# Patient Record
Sex: MALE | Race: WHITE | HISPANIC OR LATINO | Employment: OTHER | URBAN - METROPOLITAN AREA
[De-identification: names, ages, dates, MRNs, and addresses within clinical notes are randomized per-mention and may not be internally consistent; named-entity substitution may affect disease eponyms.]

---

## 2019-12-18 ENCOUNTER — TRANSFERRED RECORDS (OUTPATIENT)
Dept: HEALTH INFORMATION MANAGEMENT | Facility: CLINIC | Age: 59
End: 2019-12-18

## 2023-02-23 ENCOUNTER — MEDICAL CORRESPONDENCE (OUTPATIENT)
Dept: HEALTH INFORMATION MANAGEMENT | Facility: CLINIC | Age: 63
End: 2023-02-23

## 2023-03-02 ENCOUNTER — TRANSFERRED RECORDS (OUTPATIENT)
Dept: HEALTH INFORMATION MANAGEMENT | Facility: CLINIC | Age: 63
End: 2023-03-02

## 2023-03-06 ENCOUNTER — TRANSFERRED RECORDS (OUTPATIENT)
Dept: HEALTH INFORMATION MANAGEMENT | Facility: CLINIC | Age: 63
End: 2023-03-06

## 2023-03-07 ENCOUNTER — REFERRAL (OUTPATIENT)
Dept: TRANSPLANT | Facility: CLINIC | Age: 63
End: 2023-03-07

## 2023-03-07 DIAGNOSIS — E11.9 DIABETES MELLITUS, TYPE 2 (H): ICD-10-CM

## 2023-03-07 DIAGNOSIS — Z01.818 ENCOUNTER FOR PRE-TRANSPLANT EVALUATION FOR KIDNEY AND PANCREAS TRANSPLANT: ICD-10-CM

## 2023-03-07 DIAGNOSIS — Z99.2 ESRD (END STAGE RENAL DISEASE) ON DIALYSIS (H): ICD-10-CM

## 2023-03-07 DIAGNOSIS — I10 ESSENTIAL HYPERTENSION: ICD-10-CM

## 2023-03-07 DIAGNOSIS — N18.6 ESRD (END STAGE RENAL DISEASE) ON DIALYSIS (H): ICD-10-CM

## 2023-03-07 DIAGNOSIS — N18.5 CHRONIC KIDNEY DISEASE, STAGE 5, KIDNEY FAILURE (H): Primary | ICD-10-CM

## 2023-03-07 DIAGNOSIS — H90.3 SNHL (SENSORY-NEURAL HEARING LOSS), ASYMMETRICAL: ICD-10-CM

## 2023-03-07 DIAGNOSIS — Z76.82 ORGAN TRANSPLANT CANDIDATE: ICD-10-CM

## 2023-03-07 DIAGNOSIS — E11.40 DIABETIC NEUROPATHY (H): ICD-10-CM

## 2023-03-07 DIAGNOSIS — N18.6 END STAGE RENAL DISEASE (H): ICD-10-CM

## 2023-03-07 NOTE — LETTER
3/9/2023  Olayinka Arciniega  C/o Nan Sosa/patrick  [address]  [facility] [address]      Dear Olayinka,    Thank you for your interest in the Transplant Center at [facility]. We look forward to being a part of your care team and assisting you through the transplant process.    As we discussed, your transplant coordinator is Marta Meza (Pancreas, Kidney).  You may call your coordinator at any time with questions or concerns.  Your first scheduled call will be on 3/21/2023 between 8am-12pm.  If this needs to change, call [phone].    Please complete the following.     1. Fill out and return the enclosed forms    Authorization for Electronic Communication    Authorization to Discuss Protected Health Information    Authorization for Release of Protected Health Information    2. Sign up for:    [employer], access to your electronic medical record (see enclosed pamphlet)    [employer]EARCH, a transplant education website    You can use these tools to learn more about your transplant, communicate with your care team, and track your medical details      Sincerely,      Solid Organ Transplant  [facility]    cc: Referring Physician

## 2023-03-09 ENCOUNTER — DOCUMENTATION ONLY (OUTPATIENT)
Dept: TRANSPLANT | Facility: CLINIC | Age: 63
End: 2023-03-09

## 2023-03-09 VITALS — BODY MASS INDEX: 37.8 KG/M2 | WEIGHT: 249.4 LBS | HEIGHT: 68 IN

## 2023-03-09 NOTE — TELEPHONE ENCOUNTER
PCP: Billie Vilchis DNP  Referring Provider: Billie Vilchis DNP  Referring Diagnosis: CKD Stage 5, DM Type 2 (insulin dependent)    GFR/Date: 9 (3/1/2023)    Is patient under the age of 65? Yes  Is patient diabetic? Yes  Is patient on insulin? Yes  Was patient offered a pancreas transplant referral? Yes    Is patient in a group home/assisted living? Correctional/Treatment facility  Does patient have a guardian? No    Referral intake process completed.  Patient is aware that after financial approval is received, medical records will be requested.   Patient confirmed for a callback from transplant coordinator on 3/21/2023. (within 2 weeks)  Tentative evaluation date will be made after patient's dialysis days are figured out.  Nan with [facility] will call back.  (within 4 weeks) if appointment is virtual, does patient have capabilities of setting this up? Yes, patient is in a Correctional/Treatment facility that would be  Able to set up video phone call.  Nan with [facility] phone# [phone].    Confirmed coordinator will discuss evaluation process in more detail at the time of their call.   Patient is aware of the need to arrange age appropriate cancer screening, vaccinations, and dental care.  Reminded patient to complete questionnaire, complete medical records release, and review packet prior to evaluation visit .    Assessed patient for special needs (ie-wheelchair, assistance, guardian, and ):  Yes  Patient uses a 4 wheel walker, wheelchair and cane when needed.     Patient instructed to call [phone] with questions.     Patient gave verbal consent during intake call to obtain medical records and documents outside of [facility]:  Yes

## 2023-03-21 ENCOUNTER — DOCUMENTATION ONLY (OUTPATIENT)
Dept: TRANSPLANT | Facility: CLINIC | Age: 63
End: 2023-03-21
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
## 2023-03-22 PROBLEM — H90.3 SNHL (SENSORY-NEURAL HEARING LOSS), ASYMMETRICAL: Status: ACTIVE | Noted: 2023-03-22

## 2023-03-22 PROBLEM — Z99.2 ESRD (END STAGE RENAL DISEASE) ON DIALYSIS (H): Status: ACTIVE | Noted: 2023-03-22

## 2023-03-22 PROBLEM — E11.40 DIABETIC NEUROPATHY (H): Status: ACTIVE | Noted: 2023-03-22

## 2023-03-22 PROBLEM — E11.9 DIABETES MELLITUS, TYPE 2 (H): Status: ACTIVE | Noted: 2023-03-22

## 2023-03-22 PROBLEM — N18.6 ESRD (END STAGE RENAL DISEASE) ON DIALYSIS (H): Status: ACTIVE | Noted: 2023-03-22

## 2023-03-23 ENCOUNTER — TELEPHONE (OUTPATIENT)
Dept: TRANSPLANT | Facility: CLINIC | Age: 63
End: 2023-03-23
Payer: MEDICAID

## 2023-03-23 NOTE — TELEPHONE ENCOUNTER
Coordinated with Nan at [facility] [place]8/23 as patient's evaluation for kid/panc transplant. Told Nan that patient needs to  Be at clinic at 7:30 am on 5/8. 2 guards will be with him.  
none

## 2023-04-17 ENCOUNTER — TRANSFERRED RECORDS (OUTPATIENT)
Dept: HEALTH INFORMATION MANAGEMENT | Facility: CLINIC | Age: 63
End: 2023-04-17

## 2023-05-04 ENCOUNTER — TELEPHONE (OUTPATIENT)
Dept: TRANSPLANT | Facility: CLINIC | Age: 63
End: 2023-05-04
Payer: MEDICAID

## 2023-05-04 ENCOUNTER — DOCUMENTATION ONLY (OUTPATIENT)
Dept: TRANSPLANT | Facility: CLINIC | Age: 63
End: 2023-05-04
Payer: MEDICAID

## 2023-05-07 LAB
A1 AB TITR SERPL: 16 {TITER}
A1 AB TITR SERPL: 32 {TITER}
A2 AB TITR SERPL: 2 {TITER}
A2 AB TITR SERPL: 2 {TITER}
ABO/RH(D): NORMAL
ABO/RH(D): NORMAL
ANTIBODY SCREEN: NEGATIVE
ANTIBODY TITER IGM SCREEN: NEGATIVE
SPECIMEN EXPIRATION DATE: NORMAL

## 2023-05-08 ENCOUNTER — DOCUMENTATION ONLY (OUTPATIENT)
Dept: TRANSPLANT | Facility: CLINIC | Age: 63
End: 2023-05-08

## 2023-05-08 ENCOUNTER — ALLIED HEALTH/NURSE VISIT (OUTPATIENT)
Dept: TRANSPLANT | Facility: CLINIC | Age: 63
End: 2023-05-08
Attending: NURSE PRACTITIONER
Payer: MEDICAID

## 2023-05-08 ENCOUNTER — ANCILLARY PROCEDURE (OUTPATIENT)
Dept: CARDIOLOGY | Facility: CLINIC | Age: 63
End: 2023-05-08
Attending: NURSE PRACTITIONER
Payer: MEDICAID

## 2023-05-08 ENCOUNTER — APPOINTMENT (OUTPATIENT)
Dept: TRANSPLANT | Facility: CLINIC | Age: 63
End: 2023-05-08
Attending: INTERNAL MEDICINE
Payer: MEDICAID

## 2023-05-08 ENCOUNTER — LAB (OUTPATIENT)
Dept: LAB | Facility: CLINIC | Age: 63
End: 2023-05-08
Payer: MEDICAID

## 2023-05-08 ENCOUNTER — ANCILLARY PROCEDURE (OUTPATIENT)
Dept: GENERAL RADIOLOGY | Facility: CLINIC | Age: 63
End: 2023-05-08
Attending: NURSE PRACTITIONER
Payer: MEDICAID

## 2023-05-08 VITALS
SYSTOLIC BLOOD PRESSURE: 131 MMHG | DIASTOLIC BLOOD PRESSURE: 81 MMHG | WEIGHT: 247 LBS | OXYGEN SATURATION: 96 % | BODY MASS INDEX: 36.58 KG/M2 | HEART RATE: 76 BPM | HEIGHT: 69 IN

## 2023-05-08 DIAGNOSIS — Z01.818 ENCOUNTER FOR PRE-TRANSPLANT EVALUATION FOR KIDNEY AND PANCREAS TRANSPLANT: ICD-10-CM

## 2023-05-08 DIAGNOSIS — Z76.82 ORGAN TRANSPLANT CANDIDATE: ICD-10-CM

## 2023-05-08 DIAGNOSIS — N18.5 CHRONIC KIDNEY DISEASE, STAGE 5, KIDNEY FAILURE (H): ICD-10-CM

## 2023-05-08 DIAGNOSIS — E11.9 DIABETES MELLITUS, TYPE 2 (H): ICD-10-CM

## 2023-05-08 DIAGNOSIS — Z79.4 TYPE 2 DIABETES MELLITUS WITH HYPEROSMOLAR COMA, WITH LONG-TERM CURRENT USE OF INSULIN (H): ICD-10-CM

## 2023-05-08 DIAGNOSIS — N18.6 END STAGE RENAL DISEASE (H): ICD-10-CM

## 2023-05-08 DIAGNOSIS — N18.6 TYPE 2 DIABETES MELLITUS WITH CHRONIC KIDNEY DISEASE ON CHRONIC DIALYSIS, WITH LONG-TERM CURRENT USE OF INSULIN (H): ICD-10-CM

## 2023-05-08 DIAGNOSIS — I10 ESSENTIAL HYPERTENSION: ICD-10-CM

## 2023-05-08 DIAGNOSIS — Z01.818 ENCOUNTER FOR PRE-TRANSPLANT EVALUATION FOR KIDNEY AND PANCREAS TRANSPLANT: Primary | ICD-10-CM

## 2023-05-08 DIAGNOSIS — N25.81 SECONDARY HYPERPARATHYROIDISM (H): ICD-10-CM

## 2023-05-08 DIAGNOSIS — I15.0 RENOVASCULAR HYPERTENSION: ICD-10-CM

## 2023-05-08 DIAGNOSIS — E11.22 TYPE 2 DIABETES MELLITUS WITH CHRONIC KIDNEY DISEASE ON CHRONIC DIALYSIS, WITH LONG-TERM CURRENT USE OF INSULIN (H): ICD-10-CM

## 2023-05-08 DIAGNOSIS — Z79.4 TYPE 2 DIABETES MELLITUS WITH CHRONIC KIDNEY DISEASE ON CHRONIC DIALYSIS, WITH LONG-TERM CURRENT USE OF INSULIN (H): ICD-10-CM

## 2023-05-08 DIAGNOSIS — E66.01 MORBID OBESITY (H): Primary | ICD-10-CM

## 2023-05-08 DIAGNOSIS — Z99.2 TYPE 2 DIABETES MELLITUS WITH CHRONIC KIDNEY DISEASE ON CHRONIC DIALYSIS, WITH LONG-TERM CURRENT USE OF INSULIN (H): ICD-10-CM

## 2023-05-08 DIAGNOSIS — E11.01 TYPE 2 DIABETES MELLITUS WITH HYPEROSMOLAR COMA, WITH LONG-TERM CURRENT USE OF INSULIN (H): ICD-10-CM

## 2023-05-08 LAB
ALBUMIN SERPL BCG-MCNC: 4 G/DL (ref 3.5–5.2)
ALBUMIN UR-MCNC: 300 MG/DL
ALP SERPL-CCNC: 136 U/L (ref 40–129)
ALT SERPL W P-5'-P-CCNC: 27 U/L (ref 10–50)
ANION GAP SERPL CALCULATED.3IONS-SCNC: 12 MMOL/L (ref 7–15)
APPEARANCE UR: CLEAR
APTT PPP: 28 SECONDS (ref 22–38)
AST SERPL W P-5'-P-CCNC: 26 U/L (ref 10–50)
BASOPHILS # BLD AUTO: 0 10E3/UL (ref 0–0.2)
BASOPHILS NFR BLD AUTO: 0 %
BILIRUB SERPL-MCNC: 0.6 MG/DL
BILIRUB UR QL STRIP: NEGATIVE
BUN SERPL-MCNC: 44.8 MG/DL (ref 8–23)
CALCIUM SERPL-MCNC: 9.1 MG/DL (ref 8.8–10.2)
CHLORIDE SERPL-SCNC: 103 MMOL/L (ref 98–107)
COLOR UR AUTO: ABNORMAL
CREAT SERPL-MCNC: 5.93 MG/DL (ref 0.67–1.17)
DEPRECATED HCO3 PLAS-SCNC: 25 MMOL/L (ref 22–29)
EOSINOPHIL # BLD AUTO: 0.1 10E3/UL (ref 0–0.7)
EOSINOPHIL NFR BLD AUTO: 1 %
ERYTHROCYTE [DISTWIDTH] IN BLOOD BY AUTOMATED COUNT: 13.8 % (ref 10–15)
FACTOR 2 INTERPRETATION: NORMAL
FACTOR V INTERPRETATION: NORMAL
GFR SERPL CREATININE-BSD FRML MDRD: 10 ML/MIN/1.73M2
GLUCOSE SERPL-MCNC: 188 MG/DL (ref 70–99)
GLUCOSE UR STRIP-MCNC: 100 MG/DL
HBA1C MFR BLD: 7.6 %
HBV CORE AB SERPL QL IA: NONREACTIVE
HBV SURFACE AB SERPL IA-ACNC: >1000 M[IU]/ML
HBV SURFACE AB SERPL IA-ACNC: REACTIVE M[IU]/ML
HBV SURFACE AG SERPL QL IA: NONREACTIVE
HCT VFR BLD AUTO: 35.1 % (ref 40–53)
HCV AB SERPL QL IA: NONREACTIVE
HGB BLD-MCNC: 11.5 G/DL (ref 13.3–17.7)
HGB UR QL STRIP: ABNORMAL
HIV 1+2 AB+HIV1 P24 AG SERPL QL IA: NONREACTIVE
HYALINE CASTS: 6 /LPF
IMM GRANULOCYTES # BLD: 0 10E3/UL
IMM GRANULOCYTES NFR BLD: 0 %
INR PPP: 1.09 (ref 0.85–1.15)
KETONES UR STRIP-MCNC: NEGATIVE MG/DL
LAB DIRECTOR COMMENTS: NORMAL
LAB DIRECTOR DISCLAIMER: NORMAL
LAB DIRECTOR INTERPRETATION: NORMAL
LAB DIRECTOR METHODOLOGY: NORMAL
LAB DIRECTOR RESULTS: NORMAL
LEUKOCYTE ESTERASE UR QL STRIP: NEGATIVE
LVEF ECHO: NORMAL
LYMPHOCYTES # BLD AUTO: 2.4 10E3/UL (ref 0.8–5.3)
LYMPHOCYTES NFR BLD AUTO: 24 %
MCH RBC QN AUTO: 28.5 PG (ref 26.5–33)
MCHC RBC AUTO-ENTMCNC: 32.8 G/DL (ref 31.5–36.5)
MCV RBC AUTO: 87 FL (ref 78–100)
MONOCYTES # BLD AUTO: 0.6 10E3/UL (ref 0–1.3)
MONOCYTES NFR BLD AUTO: 6 %
MUCOUS THREADS #/AREA URNS LPF: PRESENT /LPF
NEUTROPHILS # BLD AUTO: 6.8 10E3/UL (ref 1.6–8.3)
NEUTROPHILS NFR BLD AUTO: 69 %
NITRATE UR QL: NEGATIVE
NRBC # BLD AUTO: 0 10E3/UL
NRBC BLD AUTO-RTO: 0 /100
PH UR STRIP: 6 [PH] (ref 5–7)
PLATELET # BLD AUTO: 212 10E3/UL (ref 150–450)
POTASSIUM SERPL-SCNC: 4.8 MMOL/L (ref 3.4–5.3)
PROT SERPL-MCNC: 8.1 G/DL (ref 6.4–8.3)
PSA SERPL DL<=0.01 NG/ML-MCNC: 1.08 NG/ML (ref 0–4.5)
RBC # BLD AUTO: 4.04 10E6/UL (ref 4.4–5.9)
RBC URINE: 1 /HPF
SODIUM SERPL-SCNC: 140 MMOL/L (ref 136–145)
SP GR UR STRIP: 1.02 (ref 1–1.03)
SPECIMEN DESCRIPTION: NORMAL
SPERM #/AREA URNS HPF: PRESENT /HPF
SQUAMOUS EPITHELIAL: <1 /HPF
T PALLIDUM AB SER QL: NONREACTIVE
UROBILINOGEN UR STRIP-MCNC: NORMAL MG/DL
WBC # BLD AUTO: 10 10E3/UL (ref 4–11)
WBC URINE: 2 /HPF

## 2023-05-08 PROCEDURE — 86833 HLA CLASS II HIGH DEFIN QUAL: CPT

## 2023-05-08 PROCEDURE — 85670 THROMBIN TIME PLASMA: CPT

## 2023-05-08 PROCEDURE — 93306 TTE W/DOPPLER COMPLETE: CPT | Mod: GC | Performed by: INTERNAL MEDICINE

## 2023-05-08 PROCEDURE — 85730 THROMBOPLASTIN TIME PARTIAL: CPT

## 2023-05-08 PROCEDURE — 71046 X-RAY EXAM CHEST 2 VIEWS: CPT | Performed by: RADIOLOGY

## 2023-05-08 PROCEDURE — G0463 HOSPITAL OUTPT CLINIC VISIT: HCPCS

## 2023-05-08 PROCEDURE — G0452 MOLECULAR PATHOLOGY INTERPR: HCPCS | Mod: 26 | Performed by: STUDENT IN AN ORGANIZED HEALTH CARE EDUCATION/TRAINING PROGRAM

## 2023-05-08 PROCEDURE — 85730 THROMBOPLASTIN TIME PARTIAL: CPT | Mod: 91

## 2023-05-08 PROCEDURE — 81240 F2 GENE: CPT

## 2023-05-08 PROCEDURE — 99207 PR STATISTIC IV PUSH SINGLE INITIAL SUBSTANCE: CPT | Performed by: INTERNAL MEDICINE

## 2023-05-08 PROCEDURE — 86850 RBC ANTIBODY SCREEN: CPT

## 2023-05-08 PROCEDURE — 85610 PROTHROMBIN TIME: CPT

## 2023-05-08 PROCEDURE — 86901 BLOOD TYPING SEROLOGIC RH(D): CPT

## 2023-05-08 PROCEDURE — 83036 HEMOGLOBIN GLYCOSYLATED A1C: CPT

## 2023-05-08 PROCEDURE — 86706 HEP B SURFACE ANTIBODY: CPT

## 2023-05-08 PROCEDURE — 86787 VARICELLA-ZOSTER ANTIBODY: CPT

## 2023-05-08 PROCEDURE — 81001 URINALYSIS AUTO W/SCOPE: CPT

## 2023-05-08 PROCEDURE — 99205 OFFICE O/P NEW HI 60 MIN: CPT | Performed by: SURGERY

## 2023-05-08 PROCEDURE — 86886 COOMBS TEST INDIRECT TITER: CPT

## 2023-05-08 PROCEDURE — 93000 ELECTROCARDIOGRAM COMPLETE: CPT | Performed by: INTERNAL MEDICINE

## 2023-05-08 PROCEDURE — 85390 FIBRINOLYSINS SCREEN I&R: CPT | Mod: 26 | Performed by: PATHOLOGY

## 2023-05-08 PROCEDURE — 86803 HEPATITIS C AB TEST: CPT

## 2023-05-08 PROCEDURE — 86665 EPSTEIN-BARR CAPSID VCA: CPT

## 2023-05-08 PROCEDURE — 86147 CARDIOLIPIN ANTIBODY EA IG: CPT

## 2023-05-08 PROCEDURE — 81382 HLA II TYPING 1 LOC HR: CPT | Mod: XU

## 2023-05-08 PROCEDURE — 36415 COLL VENOUS BLD VENIPUNCTURE: CPT

## 2023-05-08 PROCEDURE — 87340 HEPATITIS B SURFACE AG IA: CPT

## 2023-05-08 PROCEDURE — 86704 HEP B CORE ANTIBODY TOTAL: CPT

## 2023-05-08 PROCEDURE — 86644 CMV ANTIBODY: CPT

## 2023-05-08 PROCEDURE — 85018 HEMOGLOBIN: CPT

## 2023-05-08 PROCEDURE — G0103 PSA SCREENING: HCPCS

## 2023-05-08 PROCEDURE — 86780 TREPONEMA PALLIDUM: CPT

## 2023-05-08 PROCEDURE — 99215 OFFICE O/P EST HI 40 MIN: CPT

## 2023-05-08 PROCEDURE — 86481 TB AG RESPONSE T-CELL SUSP: CPT

## 2023-05-08 PROCEDURE — 80053 COMPREHEN METABOLIC PANEL: CPT

## 2023-05-08 PROCEDURE — 84681 ASSAY OF C-PEPTIDE: CPT

## 2023-05-08 PROCEDURE — 86832 HLA CLASS I HIGH DEFIN QUAL: CPT

## 2023-05-08 RX ORDER — FAMOTIDINE 20 MG/1
20 TABLET, FILM COATED ORAL
COMMUNITY

## 2023-05-08 RX ORDER — CALCITRIOL 0.5 UG/1
1 CAPSULE, LIQUID FILLED ORAL DAILY
COMMUNITY
Start: 2023-02-22

## 2023-05-08 RX ORDER — LIRAGLUTIDE 6 MG/ML
1.8 INJECTION SUBCUTANEOUS
COMMUNITY

## 2023-05-08 RX ORDER — ATORVASTATIN CALCIUM 40 MG/1
40 TABLET, FILM COATED ORAL AT BEDTIME
COMMUNITY

## 2023-05-08 RX ORDER — POLYVINYL ALCOHOL 14 MG/ML
SOLUTION/ DROPS OPHTHALMIC
COMMUNITY
Start: 2022-06-24

## 2023-05-08 RX ORDER — TAMSULOSIN HYDROCHLORIDE 0.4 MG/1
0.4 CAPSULE ORAL
COMMUNITY
Start: 2021-09-13

## 2023-05-08 RX ORDER — FUROSEMIDE 40 MG
80 TABLET ORAL
COMMUNITY
Start: 2023-02-22

## 2023-05-08 RX ORDER — TRIAMCINOLONE ACETONIDE 1 MG/G
OINTMENT TOPICAL
COMMUNITY

## 2023-05-08 RX ORDER — LISINOPRIL 10 MG/1
1 TABLET ORAL DAILY
COMMUNITY
Start: 2023-02-22

## 2023-05-08 RX ADMIN — Medication 5 ML: at 14:06

## 2023-05-08 NOTE — LETTER
5/8/2023         RE: Olayinka Arciniega  [facility]  [address]  [facility] [address]        Dear Colleague,    Thank you for referring your patient, Olayinka Arciniega, to the [facility] TRANSPL[facility]. Please see a copy of my visit note below.    TRANSPLANT NEPHROLOGY RECIPIENT EVALUATION NOTE    Recommendations:   - weight loss  - cardiology  - CT a/p and iliac US.   - social work input    Assessment and Plan:  # Kidney/Pancreas Transplant Evaluation: Patient is a fair candidate overall. Benefits of a living donor transplant were discussed.    # ESKD from presumed diabetes mellitus type 2: although doing OK on hemodialysis since 3/2023, he may benefit from a kidney transplant, ABO-B, no donors.     #Type 2 diabetes: A1c 6.8% using NPH 75 units daily plus Victoza.     # Cardiac Risk: needs risk assessment with [facility] given 20 + years of diabetes, family history of premature CAD and other risk factors.     # PAD Screening: needs CT a/p and iliac US.     # BMI 36.5.     # Resides in correctional facility: where staff administers all medications where he reports good compliance. Appreciate social work input.     # H/o polysubstance use (cocaine, marijuana): went to CD treatment at some point, quit in 1992. Appreciate social work input.     # Health Maintenance: 2/2023 Colonoscopy (needs records from [facility]): reportedly UTD and Dental: Not Up to date (dental work pending).     Discussed the risks and benefits of a transplant, including the risk of surgery and immunosuppression medications.  Patient's overall evaluation will be discussed in the Transplant Program's regular meeting with a final recommendation on the patients suitability for transplant to be made at that time.    Pending completion of the full evaluation, patient presently appears to be enough of an acceptable kidney transplant recipient candidate to have any potential kidney donors start the evaluation  process.      Evaluation:  Olayinka Arciniega was seen in consultation at the request of Dr. Kel Sierra for evaluation as a potential kidney/pancreas transplant recipient.    Reason for Visit:  Olayinka Arciniega is a 63 year old male with ESKD from diabetes mellitus type 2, who presents for kidney/pancreas transplant evaluation.    History of Present Illness:            Kidney Disease Hx:   Olayinka Arciniega is a 63-year-old male with history of ESKD secondary to presumed type 2 diabetes. 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 Historical  creatinines include 1.05 in 2015 -> 1.9 in 2019 -> low 2s in 2020 -> 4.64 in 2/2023. Ultimately started HD on  3/13/2023.               Kidney Disease Dx: Diabetes mellitus type 2       Biopsy Proven: No         On Dialysis: Yes, Date initiated: 3/2023  and Dialysis Type: [facility]; usign chest catheter, making urine every day, BPs controlled.        Primary Nephrologist: Dr. Telles in [place], [place]       H/o Kidney Stones: No       H/o Recurrent/Frequent UTI: No         Diabetic Hx: Type 2        Diagnosis Date: 1994 (age 37)        Medication History: started on metformin at the beginning, using insulin since 2017, currently NPH 75 units daily plus Victoza.        Diabetic Control: Controlled (HbA1c <7%)   Last HbA1c: 6.8%       Hypoglycemic Unawareness: Yes, down to 50 once every 6 months,  Checks blood sugar twice daily and typically  < 150.         End-Organ Damage due to DM: Retinopathy, Nephropathy and Peripheral neuropathy          Cardiac/Vascular Disease Risk Factors:        Cardiac Risk Factors: Diabetes, Hypertension, Smoking, Age (Male > 55, Female > 65) and Family History of Premature CAD       Known CAD: No       Known PAD/Caludication Symptoms: No       Known Heart Failure: No       Arrhythmia: No       Pulmonary Hypertension: No       Valvular Disease: No       Other: None         Viral Serology Status       CMV IgG Antibody: Unknown        EBV IgG Antibody: Unknown         Volume Status/Weight:        Volume status: Mildly hypervolemic       Weight:  Acceptable BMI       BMI: Body mass index is 36.58 kg/m .         Functional Capacity/Frailty:       Walks every day around his facility and courtyard with some dyspnea but no chest pains.     Fatigue/Decreased Energy: [x] No [] Yes    Chest Pain or SOB with Exertion: [] No [x] Yes    Significant Weight Change: [x] No [] Yes    Nausea, Vomiting or Diarrhea: [] No [x] Yes Occasional nausea, chronic loose stools   Fever, Sweats or Chills:  [x] No [] Yes    Leg Swelling [x] No [] Yes        History of Cancer: None    Other Significant Medical Issues:   - Former minimal tobacco history: quit in early 20s.    - Polysubstance (cocaine, marijuana): no H/o CD treatment, quit in 92        Allergy Testing Questions:   Medication that caused a reaction None   Antibiotics used that didn't give an allergic reaction?  Patient doesn't know    COVID Vaccination Up To Date: Yes    Potential Living Kidney Donors: No    Review of Systems:  A comprehensive review of systems was obtained and negative, except as noted in the HPI or PMH.    Past Medical History:   Medical record was reviewed and PMH was discussed with patient and noted below.  Past Medical History:   Diagnosis Date    Diabetes mellitus, type 2 (H)     Diabetic neuropathy (H)     ESRD (end stage renal disease) on dialysis (H)     Hypertension     SNHL (sensory-neural hearing loss), asymmetrical        Past Social History:   Past Surgical History:   Procedure Laterality Date    COLONOSCOPY  2019    DIALYSIS FISTULA CREATION  09/2022     Personal history of bleeding or anesthesia problems: No    Family History:  No family history on file.    Personal History:   Social History     Socioeconomic History    Marital status:      Spouse name: Not on file    Number of children: Not on file    Years of education: Not on file    Highest education level: Not on  "file   Occupational History    Not on file   Tobacco Use    Smoking status: Former     Types: Cigarettes     Quit date:      Years since quittin.3    Smokeless tobacco: Never    Tobacco comments:     4.5 packs per day for 12 years   Vaping Use    Vaping status: Not on file   Substance and Sexual Activity    Alcohol use: Not Currently    Drug use: Not Currently    Sexual activity: Not on file   Other Topics Concern    Not on file   Social History Narrative    Not on file     Social Determinants of Health     Financial Resource Strain: Not on file   Food Insecurity: Not on file   Transportation Needs: Not on file   Physical Activity: Not on file   Stress: Not on file   Social Connections: Not on file   Intimate Partner Violence: Not on file   Housing Stability: Not on file       Allergies:  Allergies   Allergen Reactions    Ibuprofen        Medications:  Current Outpatient Medications   Medication Sig    atorvastatin (LIPITOR) 40 MG tablet Take 40 mg by mouth At Bedtime    calcitRIOL (ROCALTROL) 0.5 MCG capsule Take 1 capsule by mouth daily    cholecalciferol (VITAMIN D3) 25 mcg (1000 units) capsule Take 2,000 Units by mouth    famotidine (PEPCID) 20 MG tablet Take 20 mg by mouth    furosemide (LASIX) 40 MG tablet Take 80 mg by mouth    insulin  UNIT/ML vial Inject 80 Units Subcutaneous    liraglutide (VICTOZA) 18 MG/3ML solution Inject 1.8 mg Subcutaneous    lisinopril (ZESTRIL) 10 MG tablet Take 1 tablet by mouth daily    polyvinyl alcohol (LIQUIFILM TEARS) 1.4 % ophthalmic solution APPLY 1 DROP INTO EYE(S) AS DIRECTED UP TO 6-8 TIMES A DAY    tamsulosin (FLOMAX) 0.4 MG capsule Take 0.4 mg by mouth    triamcinolone (KENALOG) 0.1 % external ointment     Urea 20 % OINT Apply 1 Dose topically     No current facility-administered medications for this visit.       Vitals:  /81   Pulse 76   Ht 1.75 m (5' 8.9\")   Wt 112 kg (247 lb)   SpO2 96%   BMI 36.58 kg/m      Exam:  GENERAL APPEARANCE: alert " and no distress  HENT: mouth without ulcers or lesions  LYMPHATICS: no cervical or supraclavicular nodes  RESP: lungs clear to auscultation - no rales, rhonchi or wheezes  CV: regular rhythm, normal rate, no rub, no murmur  FEMORAL PULSES: normal  EDEMA: no LE edema bilaterally  ABDOMEN: soft, nondistended, nontender, bowel sounds normal  MS: extremities normal - no gross deformities noted, no evidence of inflammation in joints, no muscle tenderness  SKIN: no rash    Results:   No results found for this or any previous visit (from the past 336 hour(s)).          PKE

## 2023-05-08 NOTE — LETTER
5/8/2023         RE: Olayinka Arciniega  [facility]  [address]  [facility] [address]        Dear Colleague,    Thank you for referring your patient, Olayinka Arciniega, to the [facility]. Please see a copy of my visit note below.    Transplant Surgery Consult Note    Medical record number: [id number]  YOB: 1960,   Consult requested by Dr. Telles for evaluation of kidney and pancreas transplant candidacy.    Assessment and Recommendations: Mr. Arciniega is a fair candidate for transplantation and has a good understanding of the risks and benefits of this approach to management of renal failure and diabetes. The following issues should be addressed prior to transplant:     64 yo male with type 2 DM and ESRD on dialysis since Mar 2023  Wt 247 Ht 227 BMI 37.6  Need 20 lb weight loss to get below 35 to qualify for KP  Abd obesity +, but should be doable with a 20 lb loss  On 75 NPH bid  No PSH  Neuropathy+    CT abd to look at vessels  No live donors  Blood type B  No transfusions    Risks of the surgical procedure including but not limited to the rare risk of mortality discussed in detail. Patient verbalized good understanding and had several pertinent questions which were answered satisfactorily.     Immunosuppressive regimen, management and long term risks discussed in detail.       Mr. Arciniega has Type 2 Diabetes whose condition is not expected to resolve, is expected to progress, and is expected to continue to develop related comorbid conditions.  Cardiology consult for cardiac risk stratification to be ordered: Yes  CT abdomen and pelvis without contrast to be ordered for assessment of vascular targets: Yes  Transplant listing labs ordered to include HLA, ABOx2, Cr, etc.  Dietician consult ordered: Yes  Social work consult ordered: Yes  Imaging reports reviewed:  yes  Radiology images reviewed:no  Recipient suitable to move forward with work up of living donors:   Yes      The majority of our visit was spent in counselling, discussing the medical and surgical risks of living or  donor kidney and pancreas transplantation, either in a simultaneous or sequential fashion. I contrasted approximate wait time for SPK vs living vs  donor kidneys from normal (0-85%) or higher (%) kidney donor profile index (KDPI) donors and their associated outcomes. I would not recommend this individual to consider kidneys from high KDPI donors. The reason for this decision is best summarized as: wants a KP.  Access to transplant will be impacted by living donor availability and overall candidacy for SPK, as well as the influence of blood type and degree of sensitization. We discussed advantages of preemptive transplant as well as living donor kidney transplant, and graft and patient survival outcomes associated with these options. Potential surgical complications of kidney and pancreas transplantation include bleeding, clotting, infection, wound complications, anastomotic failure and other issues such as cardiac complications, pneumonia, deep venous thrombosis, pulmonary embolism, post transplant diabetes and death. We discussed the need for protocol biopsy of the kidney and the possible need for a ureteral stent (and subsequent removal). We discussed benefits and risks associated with different approaches to exocrine drainage of pancreatic secretions. We also discussed differences in the average length of stay, recovery process, and posttransplant lab and monitoring protocol. We discussed the risk of graft rejection and recurrent diabetic nephropathy in the setting of poor glycemic control. I emphasized the need for strict immunosuppression adherence and the potential for complications of immunosuppression such as skin cancer or lymphoma, as well as a very low but not zero risk of donor-derived disease transmission risks (infection, cancer).  Arciniega asked good questions  and the patient's candidacy will be reviewed at our Multidisciplinary Selection Committee. Thank you for the opportunity to participate in Mr. Arciniega's care.    Total time: 60 minutes  Counselling time: 30 minutes    .  ---------------------------------------------------------------------------------------------------    HPI: Mr. Arciniega has End stage renal failure due to diabetes mellitus type 2. The patient has had diabetes for 30 years. Management is by  units. The patient usually checks his blood sugar 2 times/day. Hypoglyemic unawareness is an issue, down to 55, once every few months.  The diabetes is controlled.  A1C 7.1  Complications of diabetes include:    Retinopathy:  Doesn't know   Neuropathy: Yes   Gastroparesis:  No    The patient is on dialysis.    Has potential kidney donors:  No.  Interested in participation in paired exchange if a donor is willing: No    The patient has the following pertinent history:       No    Yes  Dialysis:    []      [] via:       Blood Transfusion                  []      []  Number of units:   Most recently:  Pregnancy:    []      [] Number:       Previous Transplant:  []      [] Details:    Cancer    []      [] Comment:   Kidney stones   []      [] Comment:      Recurrent infections  []      []  Type:                  Bladder dysfunction  []      [] Cause:    Claudication   []      [] Distance:    Previous Amputation  []      [] Cause:     Chronic anticoagulation  []      [] Indication:  Zoroastrian  []      []     Past Medical History:   Diagnosis Date     Diabetes mellitus, type 2 (H)      Diabetic neuropathy (H)      ESRD (end stage renal disease) on dialysis (H)      Hypertension      SNHL (sensory-neural hearing loss), asymmetrical      Past Surgical History:   Procedure Laterality Date     COLONOSCOPY  2019     DIALYSIS FISTULA CREATION  09/2022     No family history on file.  Social History     Socioeconomic History     Marital status:       Spouse name: Not on file     Number of children: Not on file     Years of education: Not on file     Highest education level: Not on file   Occupational History     Not on file   Tobacco Use     Smoking status: Former     Types: Cigarettes     Quit date:      Years since quittin.3     Smokeless tobacco: Never     Tobacco comments:     4.5 packs per day for 12 years   Vaping Use     Vaping status: Not on file   Substance and Sexual Activity     Alcohol use: Not Currently     Drug use: Not Currently     Sexual activity: Not on file   Other Topics Concern     Not on file   Social History Narrative     Not on file     Social Determinants of Health     Financial Resource Strain: Not on file   Food Insecurity: Not on file   Transportation Needs: Not on file   Physical Activity: Not on file   Stress: Not on file   Social Connections: Not on file   Intimate Partner Violence: Not on file   Housing Stability: Not on file       ROS:   CONSTITUTIONAL:  No fevers or chills  EYES: negative for icterus  ENT:  negative for hearing loss, tinnitus and sore throat  RESPIRATORY:  negative for cough, sputum, dyspnea  CARDIOVASCULAR:  negative for chest pain Fatigue  GASTROINTESTINAL:  negative for nausea, vomiting, diarrhea or constipation  GENITOURINARY:  negative for incontinence, dysuria, bladder emptying problems  HEME:  No easy bruising  INTEGUMENT:  negative for rash and pruritus  NEURO:  Negative for headache, seizure disorder    Allergies:   Allergies   Allergen Reactions     Ibuprofen        Medications:  Prescription Medications as of 2023       Rx Number Disp Refills Start End Last Dispensed Date Next Fill Date Owning Pharmacy    atorvastatin (LIPITOR) 40 MG tablet            Sig: Take 40 mg by mouth At Bedtime    Class: Historical    Route: Oral    calcitRIOL (ROCALTROL) 0.5 MCG capsule    2023        Sig: Take 1 capsule by mouth daily    Class: Historical    Route: Oral    cholecalciferol (VITAMIN D3) 25  mcg (1000 units) capsule    11/2/2022        Sig: Take 2,000 Units by mouth    Class: Historical    Route: Oral    famotidine (PEPCID) 20 MG tablet            Sig: Take 20 mg by mouth    Class: Historical    Route: Oral    furosemide (LASIX) 40 MG tablet    2/22/2023        Sig: Take 80 mg by mouth    Class: Historical    Route: Oral    insulin  UNIT/ML vial            Sig: Inject 80 Units Subcutaneous    Class: Historical    Route: Subcutaneous    liraglutide (VICTOZA) 18 MG/3ML solution            Sig: Inject 1.8 mg Subcutaneous    Class: Historical    Route: Subcutaneous    lisinopril (ZESTRIL) 10 MG tablet    2/22/2023        Sig: Take 1 tablet by mouth daily    Class: Historical    Route: Oral    polyvinyl alcohol (LIQUIFILM TEARS) 1.4 % ophthalmic solution    6/24/2022        Sig: APPLY 1 DROP INTO EYE(S) AS DIRECTED UP TO 6-8 TIMES A DAY    Class: Historical    tamsulosin (FLOMAX) 0.4 MG capsule    9/13/2021        Sig: Take 0.4 mg by mouth    Class: Historical    Route: Oral    triamcinolone (KENALOG) 0.1 % external ointment            Class: Historical    Route: Topical    Urea 20 % OINT    12/13/2021        Sig: Apply 1 Dose topically    Class: Historical    Route: Topical          Exam:   Pulse:  [76] 76  BP: (131)/(81) 131/81  SpO2:  [96 %] 96 %  Appearance: in no apparent distress.   Skin: normal  Head and Neck: Normal, no rashes or jaundice  Respiratory: easy respirations, no audible wheezing.  Cardiovascular: RRR  Abdomen: rounded, No surgical scars       Diagnostics:   No results found for this or any previous visit (from the past 672 hour(s)).  No results found for: CPRA      Again, thank you for allowing me to participate in the care of your patient.        Sincerely,        SHANNA

## 2023-05-08 NOTE — PROGRESS NOTES
Transplant Surgery Consult Note    Medical record number: [id number]  YOB: 1960,   Consult requested by Dr. Telles for evaluation of kidney and pancreas transplant candidacy.    Assessment and Recommendations: Mr. Arciniega is a fair candidate for transplantation and has a good understanding of the risks and benefits of this approach to management of renal failure and diabetes. The following issues should be addressed prior to transplant:     62 yo male with type 2 DM and ESRD on dialysis since Mar 2023  Wt 247 Ht 227 BMI 37.6  Need 20 lb weight loss to get below 35 to qualify for KP  Abd obesity +, but should be doable with a 20 lb loss  On 75 NPH bid  No PSH  Neuropathy+    CT abd to look at vessels  No live donors  Blood type B  No transfusions    Risks of the surgical procedure including but not limited to the rare risk of mortality discussed in detail. Patient verbalized good understanding and had several pertinent questions which were answered satisfactorily.     Immunosuppressive regimen, management and long term risks discussed in detail.       Mr. Arciniega has Type 2 Diabetes whose condition is not expected to resolve, is expected to progress, and is expected to continue to develop related comorbid conditions.  Cardiology consult for cardiac risk stratification to be ordered: Yes  CT abdomen and pelvis without contrast to be ordered for assessment of vascular targets: Yes  Transplant listing labs ordered to include HLA, ABOx2, Cr, etc.  Dietician consult ordered: Yes  Social work consult ordered: Yes  Imaging reports reviewed:  yes  Radiology images reviewed:no  Recipient suitable to move forward with work up of living donors:  Yes      The majority of our visit was spent in counselling, discussing the medical and surgical risks of living or  donor kidney and pancreas transplantation, either in a simultaneous or sequential fashion. I contrasted approximate wait time for SPK vs living vs   donor kidneys from normal (0-85%) or higher (%) kidney donor profile index (KDPI) donors and their associated outcomes. I would not recommend this individual to consider kidneys from high KDPI donors. The reason for this decision is best summarized as: wants a KP.  Access to transplant will be impacted by living donor availability and overall candidacy for SPK, as well as the influence of blood type and degree of sensitization. We discussed advantages of preemptive transplant as well as living donor kidney transplant, and graft and patient survival outcomes associated with these options. Potential surgical complications of kidney and pancreas transplantation include bleeding, clotting, infection, wound complications, anastomotic failure and other issues such as cardiac complications, pneumonia, deep venous thrombosis, pulmonary embolism, post transplant diabetes and death. We discussed the need for protocol biopsy of the kidney and the possible need for a ureteral stent (and subsequent removal). We discussed benefits and risks associated with different approaches to exocrine drainage of pancreatic secretions. We also discussed differences in the average length of stay, recovery process, and posttransplant lab and monitoring protocol. We discussed the risk of graft rejection and recurrent diabetic nephropathy in the setting of poor glycemic control. I emphasized the need for strict immunosuppression adherence and the potential for complications of immunosuppression such as skin cancer or lymphoma, as well as a very low but not zero risk of donor-derived disease transmission risks (infection, cancer). Mr. Arciniega asked good questions and the patient's candidacy will be reviewed at our Multidisciplinary Selection Committee. Thank you for the opportunity to participate in Mr. Arciniega's care.    Total time: 60 minutes  Counselling time: 30  minutes    .  ---------------------------------------------------------------------------------------------------    HPI: Mr. Arciniega has End stage renal failure due to diabetes mellitus type 2. The patient has had diabetes for 30 years. Management is by  units. The patient usually checks his blood sugar 2 times/day. Hypoglyemic unawareness is an issue, down to 55, once every few months.  The diabetes is controlled.  A1C 7.1  Complications of diabetes include:    Retinopathy:  Doesn't know   Neuropathy: Yes   Gastroparesis:  No    The patient is on dialysis.    Has potential kidney donors:  No.  Interested in participation in paired exchange if a donor is willing: No    The patient has the following pertinent history:       No    Yes  Dialysis:    []      [] via:       Blood Transfusion                  []      []  Number of units:   Most recently:  Pregnancy:    []      [] Number:       Previous Transplant:  []      [] Details:    Cancer    []      [] Comment:   Kidney stones   []      [] Comment:      Recurrent infections  []      []  Type:                  Bladder dysfunction  []      [] Cause:    Claudication   []      [] Distance:    Previous Amputation  []      [] Cause:     Chronic anticoagulation  []      [] Indication:  Shinto  []      []     Past Medical History:   Diagnosis Date     Diabetes mellitus, type 2 (H)      Diabetic neuropathy (H)      ESRD (end stage renal disease) on dialysis (H)      Hypertension      SNHL (sensory-neural hearing loss), asymmetrical      Past Surgical History:   Procedure Laterality Date     COLONOSCOPY  2019     DIALYSIS FISTULA CREATION  09/2022     No family history on file.  Social History     Socioeconomic History     Marital status:      Spouse name: Not on file     Number of children: Not on file     Years of education: Not on file     Highest education level: Not on file   Occupational History     Not on file   Tobacco Use     Smoking  status: Former     Types: Cigarettes     Quit date:      Years since quittin.3     Smokeless tobacco: Never     Tobacco comments:     4.5 packs per day for 12 years   Vaping Use     Vaping status: Not on file   Substance and Sexual Activity     Alcohol use: Not Currently     Drug use: Not Currently     Sexual activity: Not on file   Other Topics Concern     Not on file   Social History Narrative     Not on file     Social Determinants of Health     Financial Resource Strain: Not on file   Food Insecurity: Not on file   Transportation Needs: Not on file   Physical Activity: Not on file   Stress: Not on file   Social Connections: Not on file   Intimate Partner Violence: Not on file   Housing Stability: Not on file       ROS:   CONSTITUTIONAL:  No fevers or chills  EYES: negative for icterus  ENT:  negative for hearing loss, tinnitus and sore throat  RESPIRATORY:  negative for cough, sputum, dyspnea  CARDIOVASCULAR:  negative for chest pain Fatigue  GASTROINTESTINAL:  negative for nausea, vomiting, diarrhea or constipation  GENITOURINARY:  negative for incontinence, dysuria, bladder emptying problems  HEME:  No easy bruising  INTEGUMENT:  negative for rash and pruritus  NEURO:  Negative for headache, seizure disorder    Allergies:   Allergies   Allergen Reactions     Ibuprofen        Medications:  Prescription Medications as of 2023       Rx Number Disp Refills Start End Last Dispensed Date Next Fill Date Owning Pharmacy    atorvastatin (LIPITOR) 40 MG tablet            Sig: Take 40 mg by mouth At Bedtime    Class: Historical    Route: Oral    calcitRIOL (ROCALTROL) 0.5 MCG capsule    2023        Sig: Take 1 capsule by mouth daily    Class: Historical    Route: Oral    cholecalciferol (VITAMIN D3) 25 mcg (1000 units) capsule    2022        Sig: Take 2,000 Units by mouth    Class: Historical    Route: Oral    famotidine (PEPCID) 20 MG tablet            Sig: Take 20 mg by mouth    Class: Historical     Route: Oral    furosemide (LASIX) 40 MG tablet    2/22/2023        Sig: Take 80 mg by mouth    Class: Historical    Route: Oral    insulin  UNIT/ML vial            Sig: Inject 80 Units Subcutaneous    Class: Historical    Route: Subcutaneous    liraglutide (VICTOZA) 18 MG/3ML solution            Sig: Inject 1.8 mg Subcutaneous    Class: Historical    Route: Subcutaneous    lisinopril (ZESTRIL) 10 MG tablet    2/22/2023        Sig: Take 1 tablet by mouth daily    Class: Historical    Route: Oral    polyvinyl alcohol (LIQUIFILM TEARS) 1.4 % ophthalmic solution    6/24/2022        Sig: APPLY 1 DROP INTO EYE(S) AS DIRECTED UP TO 6-8 TIMES A DAY    Class: Historical    tamsulosin (FLOMAX) 0.4 MG capsule    9/13/2021        Sig: Take 0.4 mg by mouth    Class: Historical    Route: Oral    triamcinolone (KENALOG) 0.1 % external ointment            Class: Historical    Route: Topical    Urea 20 % OINT    12/13/2021        Sig: Apply 1 Dose topically    Class: Historical    Route: Topical          Exam:   Pulse:  [76] 76  BP: (131)/(81) 131/81  SpO2:  [96 %] 96 %  Appearance: in no apparent distress.   Skin: normal  Head and Neck: Normal, no rashes or jaundice  Respiratory: easy respirations, no audible wheezing.  Cardiovascular: RRR  Abdomen: rounded, No surgical scars       Diagnostics:   No results found for this or any previous visit (from the past 672 hour(s)).  No results found for: CPRA

## 2023-05-08 NOTE — PROGRESS NOTES
TRANSPLANT NEPHROLOGY RECIPIENT EVALUATION NOTE    Recommendations:   - weight loss  - cardiology  - CT a/p and iliac US.   - social work input    Assessment and Plan:  # Kidney/Pancreas Transplant Evaluation: Patient is a fair candidate overall. Benefits of a living donor transplant were discussed.    # ESKD from presumed diabetes mellitus type 2: although doing OK on hemodialysis since 3/2023, he may benefit from a kidney transplant, ABO-B, no donors.     #Type 2 diabetes: A1c 6.8% using NPH 75 units daily plus Victoza.     # Cardiac Risk: needs risk assessment with [facility] given 20 + years of diabetes, family history of premature CAD and other risk factors.     # PAD Screening: needs CT a/p and iliac US.     # BMI 36.5.     # Resides in correctional facility: where staff administers all medications where he reports good compliance. Appreciate social work input.     # H/o polysubstance use (cocaine, marijuana): went to CD treatment at some point, quit in 1992. Appreciate social work input.     # Health Maintenance: 2/2023 Colonoscopy (needs records from [facility]): reportedly UTD and Dental: Not Up to date (dental work pending).     Discussed the risks and benefits of a transplant, including the risk of surgery and immunosuppression medications.  Patient's overall evaluation will be discussed in the Transplant Program's regular meeting with a final recommendation on the patients suitability for transplant to be made at that time.    Pending completion of the full evaluation, patient presently appears to be enough of an acceptable kidney transplant recipient candidate to have any potential kidney donors start the evaluation process.      Evaluation:  Olayinka Arciniega was seen in consultation at the request of Dr. Kel Sierra for evaluation as a potential kidney/pancreas transplant recipient.    Reason for Visit:  Olayinka Arciniega is a 63 year old male with ESKD from diabetes mellitus type 2, who presents for  kidney/pancreas transplant evaluation.    History of Present Illness:            Kidney Disease Hx:   Olayinka Arciniega is a 63-year-old male with history of ESKD secondary to presumed type 2 diabetes. 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 Historical  creatinines include 1.05 in 2015 -> 1.9 in 2019 -> low 2s in 2020 -> 4.64 in 2/2023. Ultimately started HD on  3/13/2023.               Kidney Disease Dx: Diabetes mellitus type 2       Biopsy Proven: No         On Dialysis: Yes, Date initiated: 3/2023  and Dialysis Type: [facility] HD; usign chest catheter, making urine every day, BPs controlled.        Primary Nephrologist: Dr. Telles in [place], [place]       H/o Kidney Stones: No       H/o Recurrent/Frequent UTI: No         Diabetic Hx: Type 2        Diagnosis Date: 1994 (age 37)        Medication History: started on metformin at the beginning, using insulin since 2017, currently NPH 75 units daily plus Victoza.        Diabetic Control: Controlled (HbA1c <7%)   Last HbA1c: 6.8%       Hypoglycemic Unawareness: Yes, down to 50 once every 6 months,  Checks blood sugar twice daily and typically  < 150.         End-Organ Damage due to DM: Retinopathy, Nephropathy and Peripheral neuropathy          Cardiac/Vascular Disease Risk Factors:        Cardiac Risk Factors: Diabetes, Hypertension, Smoking, Age (Male > 55, Female > 65) and Family History of Premature CAD       Known CAD: No       Known PAD/Caludication Symptoms: No       Known Heart Failure: No       Arrhythmia: No       Pulmonary Hypertension: No       Valvular Disease: No       Other: None         Viral Serology Status       CMV IgG Antibody: Unknown       EBV IgG Antibody: Unknown         Volume Status/Weight:        Volume status: Mildly hypervolemic       Weight:  Acceptable BMI       BMI: Body mass index is 36.58 kg/m .         Functional Capacity/Frailty:       Walks every day around his facility and courtyard with some dyspnea but no  chest pains.     Fatigue/Decreased Energy: [x] No [] Yes    Chest Pain or SOB with Exertion: [] No [x] Yes    Significant Weight Change: [x] No [] Yes    Nausea, Vomiting or Diarrhea: [] No [x] Yes Occasional nausea, chronic loose stools   Fever, Sweats or Chills:  [x] No [] Yes    Leg Swelling [x] No [] Yes        History of Cancer: None    Other Significant Medical Issues:   - Former minimal tobacco history: quit in early 20s.    - Polysubstance (cocaine, marijuana): no H/o CD treatment, quit in         Allergy Testing Questions:   Medication that caused a reaction None   Antibiotics used that didn't give an allergic reaction?  Patient doesn't know    COVID Vaccination Up To Date: Yes    Potential Living Kidney Donors: No    Review of Systems:  A comprehensive review of systems was obtained and negative, except as noted in the HPI or PMH.    Past Medical History:   Medical record was reviewed and PMH was discussed with patient and noted below.  Past Medical History:   Diagnosis Date     Diabetes mellitus, type 2 (H)      Diabetic neuropathy (H)      ESRD (end stage renal disease) on dialysis (H)      Hypertension      SNHL (sensory-neural hearing loss), asymmetrical        Past Social History:   Past Surgical History:   Procedure Laterality Date     COLONOSCOPY  2019     DIALYSIS FISTULA CREATION  2022     Personal history of bleeding or anesthesia problems: No    Family History:  No family history on file.    Personal History:   Social History     Socioeconomic History     Marital status:      Spouse name: Not on file     Number of children: Not on file     Years of education: Not on file     Highest education level: Not on file   Occupational History     Not on file   Tobacco Use     Smoking status: Former     Types: Cigarettes     Quit date:      Years since quittin.3     Smokeless tobacco: Never     Tobacco comments:     4.5 packs per day for 12 years   Vaping Use     Vaping status: Not  "on file   Substance and Sexual Activity     Alcohol use: Not Currently     Drug use: Not Currently     Sexual activity: Not on file   Other Topics Concern     Not on file   Social History Narrative     Not on file     Social Determinants of Health     Financial Resource Strain: Not on file   Food Insecurity: Not on file   Transportation Needs: Not on file   Physical Activity: Not on file   Stress: Not on file   Social Connections: Not on file   Intimate Partner Violence: Not on file   Housing Stability: Not on file       Allergies:  Allergies   Allergen Reactions     Ibuprofen        Medications:  Current Outpatient Medications   Medication Sig     atorvastatin (LIPITOR) 40 MG tablet Take 40 mg by mouth At Bedtime     calcitRIOL (ROCALTROL) 0.5 MCG capsule Take 1 capsule by mouth daily     cholecalciferol (VITAMIN D3) 25 mcg (1000 units) capsule Take 2,000 Units by mouth     famotidine (PEPCID) 20 MG tablet Take 20 mg by mouth     furosemide (LASIX) 40 MG tablet Take 80 mg by mouth     insulin  UNIT/ML vial Inject 80 Units Subcutaneous     liraglutide (VICTOZA) 18 MG/3ML solution Inject 1.8 mg Subcutaneous     lisinopril (ZESTRIL) 10 MG tablet Take 1 tablet by mouth daily     polyvinyl alcohol (LIQUIFILM TEARS) 1.4 % ophthalmic solution APPLY 1 DROP INTO EYE(S) AS DIRECTED UP TO 6-8 TIMES A DAY     tamsulosin (FLOMAX) 0.4 MG capsule Take 0.4 mg by mouth     triamcinolone (KENALOG) 0.1 % external ointment      Urea 20 % OINT Apply 1 Dose topically     No current facility-administered medications for this visit.       Vitals:  /81   Pulse 76   Ht 1.75 m (5' 8.9\")   Wt 112 kg (247 lb)   SpO2 96%   BMI 36.58 kg/m      Exam:  GENERAL APPEARANCE: alert and no distress  HENT: mouth without ulcers or lesions  LYMPHATICS: no cervical or supraclavicular nodes  RESP: lungs clear to auscultation - no rales, rhonchi or wheezes  CV: regular rhythm, normal rate, no rub, no murmur  FEMORAL PULSES: normal  EDEMA: " no LE edema bilaterally  ABDOMEN: soft, nondistended, nontender, bowel sounds normal  MS: extremities normal - no gross deformities noted, no evidence of inflammation in joints, no muscle tenderness  SKIN: no rash    Results:   No results found for this or any previous visit (from the past 336 hour(s)).

## 2023-05-08 NOTE — PROGRESS NOTES
Kidney, Pancreas Transplant Evaluation - 5/8/2023  Patient attended appointments accompanied by 2 DOC capri  Patient completed AM appointments with all [facility] providers.  Time and location of PM appointments reviewed with patient.  Patient instructed next contact from Transplant Coordinator will be following Selection Committee  Patient stated understanding  Patient stated written Transplant Educational materials and Receipt of Information for Organ Transplant Recipient form signed.  KDPI form signed and faxed to HIM  Patient stated he watched [facility] Place Pre Kidney Transplant videos 1&2.      Summary    Team s concerns/comments:   1) Cardiac risk assessment  2) PAD assessment  3) BMI  4) Resides in correctional facility  5)) Health maintenance    Candidacy category: Yellow    Action/Plan:   1) EKG, Echocardiogram today. Cardiology consult and clearance. [facility]  2) A/P CT without contrast, Iliac US  3) Weight Loss Management. Needs 20 lb weight loss for K/P  4) SW input  5) Dental work pending, confirm colonoscopy      Expected Selection Meeting Discussion: 5/17/23

## 2023-05-08 NOTE — PROGRESS NOTES
Psychosocial Assessment  Patient Name/ Age: Olayinka Arciniega 63 year old   Medical Record #: [id number]  Duration of Interview:     45   min  Process:   Face-to-Face Interview                (counseling < 50%)   Present at Appointment: Olayinka and two guards from [facility] Katherin        :URSULA Vogel, LICSW Date:  May 8, 2023        Type of transplant: Kidney/Pancreas    Donor type:   Olayinka indicated he does not have of any potential kidney donors at this time.   Cadaver   Prior Transplants:    No Status of Transplant:       Current Living Situation    Location:   [facility]  [address]  [facility] [address]  With Whom: lives in a correctional facility       Family/ Social Support:    Olayinka has two children who Dylon (42) lives in [place], [place] and Mike (36) [place], [place].  He has two sisters ([place]) and one brother ([place], [place]).   Available with reservation   Committed relationship:   Single   Other supports:   Friends Available, helpful       Activities/ Functional Ability    Current level:  Olayinka wears corrective lenses.  He indicated his medications are controlled by staff.  He indicated he was refusing to take his medications in the past but for the past seven years has been compliant.   Ambulatory, visually impaired and independent with ADL's     Transportation Other: Relies on [facility] staff for all transportation needs.       Vocational/Employment/Financial     Employment   Unemployed   Job Description      Income   Other: None   Insurance    At this time, patient can afford medication costs:  Yes  MA       Medical Status    Current mode of treatment for ESRD Dialysis   Complications - Diabetes controlled with insulin. Neuropathy       Behavioral    Tobacco Use No Chemical Dependency No       Psychiatric Impairment No  Olayinka indicated he is on medication for depression which is of assistance.    Reading ability Good  Education Level: 10th Grade Recent Legal  History Yes   Criminal history starting in 1994.    Coping Style/Strategies: Olayinka indicated when under stress he will cook or listen to music.     Ability to Adhere to Complex Medical Regime: Yes     Adherence History:  Olayinka indicated he will usually follow his physician's recommendations.        Education  _X_ Medicare  _X_ Rehabilitation  _X_ Donor issues  _X_ Community resources  _X_ Post discharge housing  _X_ Financial resources  _X_ Medical insurance options  _X_ Psych adjustment  _X_ Family adjustment  _X_ Health Care Directive - Yes, Will Provide Psychosocial Risks of Transplant Reviewed and Discussed:  _X_ Increased stress related to emotional,            family, social, employment or financial           situation  _X_ Affect on work and/or disability benefits  _X_ Affect on future life insurance  _X_ Transplant outcome expectations may           not be met  _X_ Mental Health Risks: anxiety,           depression, PTSD, guilt, grief and           chronic fatigue     Notable Items:   None noted.       Final Evaluation/Assessment   Patient seemed to process information well. Appeared well informed, motivated and able to follow post transplant requirements. Behavior was appropriate during interview. Has adequate income and insurance coverage. Adequate social support. No major contraindications noted for transplant.  At this time patient appears to understand the risks and benefits of transplant.      Recommendation  Acceptable    Selection Criteria Met:  Plan for support Yes   Chemical Dependence Yes   Smoking Yes   Mental Health Yes   Adequate Finances Yes    Signature: URSULA Vogel, [facility] HospitalSW   Title: Clinical

## 2023-05-08 NOTE — PROGRESS NOTES
[facility] Solid Organ Transplant  Outpatient MNT: Kidney Transplant Evaluation    Current BMI: 36.58 (HT 69 in,  lbs/112 kg)  BMI guideline for kidney transplant up to a BMI of 40 / per surgeon discretion     Frailty Assessment -- Not Frail (1/5, 1 for  strength)      Time Spent: 30 minutes  Visit Type: Initial  Referring Physician: Sonido Linton  Pt accompanied by: 2 guard members    History of previous txp: no  Dialysis: Yes    Dialysis Info: 3 x per week (Tues, Thurs, Sat)  Protein supplement: No    Nutrition Assessment   Frequency of BG checks: 2 x per day   Hypoglycemia: Per patient report, 3x/week; unawareness: no, pt aware of symptoms  Last A1c: Patient reports last value at 7.    - Appetite: Good  - Food allergies/intolerances: No  - Meal prep & grocery shopping: Meals provided at facility  - Previous RD education: At dialysis center and one at facility  - Issues chewing or swallowing: Occasionally difficulty with swallowing. Pt reports taking a medication for it.   - N/V/D/C: Diarrhea - depends on what he eats    Vitamins, Supplements, Pertinent Meds: No  Herbal Medicines/Supplements: No    Edema: Patient reports present in hands and feet    Weight hx: Patient reports great fluctuations with dialysis treatment.   Wt Readings from Last Encounters:   05/08/23 112 kg (247 lb)   03/09/23 113.1 kg (249 lb 6.4 oz)     Diet Recall  Breakfast Hot cereal  2- 4oz juice  1 Egg  2 slices of white bread   Lunch Hamburger carol  Rice  Veggies   Dinner Chicken and rice  Veggies  Cranberry juice  Dessert (cookie or cup of fruit)   Snacks No usually  Sometimes fruit   Beverages Cranberry juice  Water      Physical Activity  Walking daily      Labs  03/14/23 CE - 4.0 - Phosphorus   Patient reports being told Potassium and Phos WNL at dialysis center.     Nutrition Diagnosis  No nutrition diagnosis identified at this time     Nutrition Intervention  Nutrition education provided:  Discussed sodium intake  (low sodium foods and drinks, seasoning food without salt and tips for low sodium diet).    Reviewed post txp diet guidelines in brief (will review in further detail post txp):  (1) Review of proper food safety measures d/t immunosuppressant therapy post-op and increased risk for food-borne illness    (2) Avoid the following post txp d/t risk for rejection, unknown effects on the organs, and/or potential interactions with immunosuppressants:  - Herbal, Chinese, holistic, chiropractic, natural, alternative medicines and supplements  - Detoxes and cleanses  - Weight loss pills  - Protein powders or other products with extracts or herbs (ie green tea extract)    (3) Med regimen and possible side effects    Patient Understanding: Pt verbalized understanding of education provided.  Expected Engagement: Good  Follow-Up Plans: PRN     Nutrition Goals  No nutrition goals identified at this time     Tara Bray RD, LD

## 2023-05-09 LAB
ATRIAL RATE - MUSE: 81 BPM
C PEPTIDE SERPL-MCNC: 7.2 NG/ML (ref 0.9–6.9)
CMV IGG SERPL IA-ACNC: 9.7 U/ML
CMV IGG SERPL IA-ACNC: ABNORMAL
DIASTOLIC BLOOD PRESSURE - MUSE: NORMAL MMHG
DRVVT SCREEN RATIO: 0.95
EBV VCA IGG SER IA-ACNC: >750 U/ML
EBV VCA IGG SER IA-ACNC: POSITIVE
GAMMA INTERFERON BACKGROUND BLD IA-ACNC: 0.05 IU/ML
INR PPP: 1.16 (ref 0.85–1.15)
INTERPRETATION ECG - MUSE: NORMAL
LA PPP-IMP: NEGATIVE
LUPUS INTERPRETATION: ABNORMAL
M TB IFN-G BLD-IMP: NEGATIVE
M TB IFN-G CD4+ BCKGRND COR BLD-ACNC: 9.95 IU/ML
MITOGEN IGNF BCKGRD COR BLD-ACNC: 0.19 IU/ML
MITOGEN IGNF BCKGRD COR BLD-ACNC: 0.2 IU/ML
P AXIS - MUSE: 7 DEGREES
PR INTERVAL - MUSE: 168 MS
PTT RATIO: 1.05
QRS DURATION - MUSE: 96 MS
QT - MUSE: 368 MS
QTC - MUSE: 427 MS
QUANTIFERON MITOGEN: 10 IU/ML
QUANTIFERON NIL TUBE: 0.05 IU/ML
QUANTIFERON TB1 TUBE: 0.24 IU/ML
QUANTIFERON TB2 TUBE: 0.25
R AXIS - MUSE: -11 DEGREES
SYSTOLIC BLOOD PRESSURE - MUSE: NORMAL MMHG
T AXIS - MUSE: 6 DEGREES
THROMBIN TIME: 16.5 SECONDS (ref 13–19)
VENTRICULAR RATE- MUSE: 81 BPM
VZV IGG SER QL IA: 3843 INDEX
VZV IGG SER QL IA: POSITIVE

## 2023-05-10 LAB
CARDIOLIPIN IGG SER IA-ACNC: 8 GPL-U/ML
CARDIOLIPIN IGG SER IA-ACNC: NEGATIVE
CARDIOLIPIN IGM SER IA-ACNC: 3.7 MPL-U/ML
CARDIOLIPIN IGM SER IA-ACNC: NEGATIVE

## 2023-05-15 LAB
A*LOCUS SEROLOGIC EQUIVALENT: 24
A*LOCUS: NORMAL
ABTEST METHOD: NORMAL
B*: NORMAL
B*LOCUS SEROLOGIC EQUIVALENT: 39
B*LOCUS: NORMAL
B*SEROLOGIC EQUIVALENT: 39
BW-1: NORMAL
C*LOCUS SEROLOGIC EQUIVALENT: 7
C*LOCUS: NORMAL
DPA1*: NORMAL
DPB1*: NORMAL
DQA1*LOCUS: NORMAL
DQB1*LOCUS SEROLOGIC EQUIVALENT: 8
DQB1*LOCUS: NORMAL
DRB1*LOCUS SEROLOGIC EQUIVALENT: 4
DRB1*LOCUS: NORMAL
DRB4*LOCUS SEROLOGIC EQUIVALENT: 53
DRB4*LOCUS: NORMAL
DRSSO TEST METHOD: NORMAL
PROTOCOL CUTOFF: NORMAL
SA 1 CELL: NORMAL
SA 1 TEST METHOD: NORMAL
SA 2 CELL: NORMAL
SA 2 TEST METHOD: NORMAL
SA1 HI RISK ABY: NORMAL
SA1 MOD RISK ABY: NORMAL
SA2 HI RISK ABY: NORMAL
SA2 MOD RISK ABY: NORMAL
UNACCEPTABLE ANTIGENS: NORMAL
UNOS CPRA: 7
ZZZSA 1  COMMENTS: NORMAL
ZZZSA 2 COMMENTS: NORMAL

## 2023-05-17 PROBLEM — I10 HYPERTENSION: Status: ACTIVE | Noted: 2023-05-17

## 2023-05-17 PROBLEM — N25.81 SECONDARY HYPERPARATHYROIDISM (H): Status: ACTIVE | Noted: 2023-05-17

## 2023-06-02 ENCOUNTER — TELEPHONE (OUTPATIENT)
Dept: TRANSPLANT | Facility: CLINIC | Age: 63
End: 2023-06-02
Payer: MEDICAID

## 2023-06-02 ENCOUNTER — DOCUMENTATION ONLY (OUTPATIENT)
Dept: TRANSPLANT | Facility: CLINIC | Age: 63
End: 2023-06-02
Payer: MEDICAID

## 2023-06-02 ENCOUNTER — CARE COORDINATION (OUTPATIENT)
Dept: TRANSPLANT | Facility: CLINIC | Age: 63
End: 2023-06-02
Payer: MEDICAID

## 2023-06-02 NOTE — TELEPHONE ENCOUNTER
Called Nan  today at [facility] ph # [phone], reached her VM and LM asking her to return my call to arrange a call with patient to review outcome of eval.     Also call Monika nurse at [facility] at [phone], reached her VM and LM asking her to return my call to arrange a call with patient to review outcome of eval.     Generated letter today in Epic - electronically routed to pt/ providers.     Spoke with Monika today, reviewed items needed for eval per my Transplant Eval Summary Letter.  Monika will assist in arranging appts locally.  Monika expressed excellent understanding of all and was in good agreement with the plan.

## 2023-06-02 NOTE — TELEPHONE ENCOUNTER
Monika Howard Nurse from [facility] wanting to speak with Coordinator, Nurse hasn't received follow up plan or DrGenoveva Notes from patient's appointment on 5/8/23

## 2023-06-02 NOTE — LETTER
06/02/23        Olayinka Conrad [facility]  [address]  [facility] [address]        Dear Olayinka,    It was a pleasure to see you recently for consideration of kidney and pancreas transplantation. Your pre-transplant evaluation results were reviewed at our Multidisciplinary Selection Committee on 05/17/2023. The Committee is requesting the following items are completed for your pre kidney pancreas transplant evaluation:     Cardiologist appointment for assessment of your heart status and for a recommendation for you to proceed with a transplant surgery. It is required that your assessment includes a left heart catheterization.   Abdominal pelvic CT without contrast to assess for any arterial calciications.   Aorto-iliac artery ultrasound to assess for any narrowed areas or blockages.   Your current weight of 247 pounds and Body Mass Index ( BMI ) of 37.6 is acceptable to proceed with kidney alone transplant. You do need to lose 20 pounds and have a BMI < 35 to meet criteria for the option of a pancreas transplant. You will need to have a return appointment here with a transplant surgeon to approve your weight loss to proceed with a pancreas transplant.   Dental work needs to remain up to date. Please work with your dentist for this.   Vaccinations need to remain up to date for hepatitis B, pneumovax and Shingrix. Please work with your primary care doctor for this.   Colonoscopy needs to remain up to date. Please work with your primary care doctor for this.   Receipt of Information consent form still needs to be signed. You should have received this in a maroon folder with some educational materials prior to your appointments at our Clinic. Please sign this now and fax it back to our Office attention to Marta at fax # [phone].   Review of [facility] patient education over the phone with myself. If not already done so, please view pre kidney eval parts 1 and 2 at  [email or web address]                    You may schedule the above appointments locally with the assistance of your regular providers. Please do call me and let me know when you have completed all of the above appointments.  At that time I will have your results reviewed at the Multidisciplinary Selection Committee for approval.  When all is approved, except for the weight loss of 20 pounds, you will be eligible to be added onto the kidney alone transplant waitlist on ACTIVE status as well as to proceed with a live donor kidney transplant in the event you have an approved live donor. When you have completed all of the above, including the 20 pound weight loss, you will also be eligible to be added onto the simultaneous kidney pancreas transplant waitlist on ACTIVE status.     Please have any potential live kidney donors register online at [facility] to initiate their evaluations through our program's living donor screening tool at [email or web address]. Please note that potential donors will get an e-mail response once they submit their form within 1-2 business days. Potential donors may call our Main Office Number at [phone] and ask to speak with a donor coordinator if they have questions about the process. You will be notified by your transplant coordinator if a live donor has been approved for donation.      You have not been added onto the waitlists at this time because you are already on dialysis. In the future when you are added onto the waitlists, your wait time accrual start date will be the same as the start date of your chronic dialysis which is 03/14/2023.     For any questions, please contact myself at the Transplant Office Main Number at [phone] or at my Direct Number at [phone].      Sincerely,  Marta Meza, RN, BSN  Pre Kidney Pancreas Transplant Coordinator   [facility]  Solid Organ Transplant Care   [address] [place] [address] [address]   [address] [address]  [email or web address]   Office Number: [phone] Direct Number: [phone]   Fax Number: [phone]  Employed by [facility]     CC's: Billie Vilchis NP, Nan  at [facility]

## 2023-07-17 ENCOUNTER — TRANSFERRED RECORDS (OUTPATIENT)
Dept: HEALTH INFORMATION MANAGEMENT | Facility: CLINIC | Age: 63
End: 2023-07-17
Payer: MEDICAID

## 2023-07-21 ENCOUNTER — TRANSFERRED RECORDS (OUTPATIENT)
Dept: HEALTH INFORMATION MANAGEMENT | Facility: CLINIC | Age: 63
End: 2023-07-21

## 2023-08-28 ENCOUNTER — TRANSFERRED RECORDS (OUTPATIENT)
Dept: HEALTH INFORMATION MANAGEMENT | Facility: CLINIC | Age: 63
End: 2023-08-28

## 2023-08-29 ENCOUNTER — TRANSFERRED RECORDS (OUTPATIENT)
Dept: HEALTH INFORMATION MANAGEMENT | Facility: CLINIC | Age: 63
End: 2023-08-29

## 2023-09-05 ENCOUNTER — TRANSFERRED RECORDS (OUTPATIENT)
Dept: HEALTH INFORMATION MANAGEMENT | Facility: CLINIC | Age: 63
End: 2023-09-05

## 2023-11-06 ENCOUNTER — TELEPHONE (OUTPATIENT)
Dept: TRANSPLANT | Facility: CLINIC | Age: 63
End: 2023-11-06
Payer: MEDICAID

## 2023-11-07 NOTE — TELEPHONE ENCOUNTER
Reviewed chart for purpose of pre kidney pancreas transplant evaluation status.     Pt has completed abd pelvic CT, aorto iliac artery US, and it appears has completed dental.     Pt still in process of completing cardiology workup, needs to sign Receipt of Info and review of MTP pt education. Is supposed to be working on wt loss - ? status of this.

## 2023-11-22 ENCOUNTER — TRANSFERRED RECORDS (OUTPATIENT)
Dept: HEALTH INFORMATION MANAGEMENT | Facility: CLINIC | Age: 63
End: 2023-11-22

## 2023-12-12 ENCOUNTER — TRANSFERRED RECORDS (OUTPATIENT)
Dept: HEALTH INFORMATION MANAGEMENT | Facility: CLINIC | Age: 63
End: 2023-12-12
Payer: MEDICAID

## 2024-01-03 ENCOUNTER — TRANSFERRED RECORDS (OUTPATIENT)
Dept: HEALTH INFORMATION MANAGEMENT | Facility: CLINIC | Age: 64
End: 2024-01-03
Payer: MEDICAID

## 2024-01-03 LAB — EJECTION FRACTION: 69 %

## 2024-01-08 ENCOUNTER — TRANSFERRED RECORDS (OUTPATIENT)
Dept: HEALTH INFORMATION MANAGEMENT | Facility: CLINIC | Age: 64
End: 2024-01-08
Payer: MEDICAID

## 2024-01-08 ENCOUNTER — TELEPHONE (OUTPATIENT)
Dept: TRANSPLANT | Facility: CLINIC | Age: 64
End: 2024-01-08
Payer: MEDICAID

## 2024-01-08 NOTE — TELEPHONE ENCOUNTER
Spoke with Mukesh Cummings RN CM for [facility] phone # [phone], who called to inquire as to transplant evaluation status. Reviewed my Transplant Evaluation Summary Letter with her. She will look for these items and fax to our Office what she finds.

## 2024-01-09 ENCOUNTER — TRANSFERRED RECORDS (OUTPATIENT)
Dept: HEALTH INFORMATION MANAGEMENT | Facility: CLINIC | Age: 64
End: 2024-01-09

## 2024-02-21 ENCOUNTER — TRANSFERRED RECORDS (OUTPATIENT)
Dept: HEALTH INFORMATION MANAGEMENT | Facility: CLINIC | Age: 64
End: 2024-02-21
Payer: MEDICAID

## 2024-02-21 ENCOUNTER — MEDICAL CORRESPONDENCE (OUTPATIENT)
Dept: HEALTH INFORMATION MANAGEMENT | Facility: CLINIC | Age: 64
End: 2024-02-21
Payer: MEDICAID

## 2024-02-26 ENCOUNTER — TRANSFERRED RECORDS (OUTPATIENT)
Dept: HEALTH INFORMATION MANAGEMENT | Facility: CLINIC | Age: 64
End: 2024-02-26

## 2024-03-19 ENCOUNTER — DOCUMENTATION ONLY (OUTPATIENT)
Dept: TRANSPLANT | Facility: CLINIC | Age: 64
End: 2024-03-19
Payer: MEDICAID

## 2024-03-28 ENCOUNTER — TELEPHONE (OUTPATIENT)
Dept: TRANSPLANT | Facility: CLINIC | Age: 64
End: 2024-03-28
Payer: MEDICAID

## 2024-03-28 NOTE — TELEPHONE ENCOUNTER
Nan (MSOP) returned my call. Olayinka needs a LHC and their primary provider at the treatment facility will discuss with cardiologist that saw Olayinka. He needs to sign and return the NAHOMY and this will be mailed to him. He also needs to review the MTP and appointment was made for 4/1/2024 at 1300 for patient and staff nurse to call Marta directly to discuss.

## 2024-03-28 NOTE — TELEPHONE ENCOUNTER
Returned call to Nan at [facility] and was only able to leave a message that I was returning her call. Provided my contact number.

## 2024-04-08 ENCOUNTER — TELEPHONE (OUTPATIENT)
Dept: TRANSPLANT | Facility: CLINIC | Age: 64
End: 2024-04-08
Payer: MEDICAID

## 2024-04-08 DIAGNOSIS — Z76.82 AWAITING ORGAN TRANSPLANT: Primary | ICD-10-CM

## 2024-04-08 NOTE — LETTER
PHYSICIAN ORDER   ALA/PRA BLOOD    DATE & TIME ISSUED: 2024 2:58 PM  PATIENT NAME: Olayinka Arciniega   : 1960     [facility] MR#  [id number]     DIAGNOSIS/ICD-10 CODE: Awaiting Organ transplant [Z76.82}   EXPIRES: (1 YEAR AFTER DATE ISSUED)  EVERY 12 weeks / 3 months   1. Please draw 20ml of blood in red top (plain) tube for Antileukocyte Antibody (ALA or PRA).   2. Label tubes with the patient s name, complete lab slip.         3. Mailers, lab slips with instructions are sent to patient separately.      4. Call the Outreach Lab at [phone] to reorder mailers.       5. Mail blood to (this address is also on the mailers):    IMMUNOLOGY LABORATORY   [facility]   [address] [place] [address]    .  Lisy Duval in Immunology and Transplantation  Surgical Director, Kidney & Pancreas Transplant Programs  Medical Director, Solid Organ Transplant Unit

## 2024-04-08 NOTE — TELEPHONE ENCOUNTER
Called Nan at [facility] [place] [facility] and spoke with her.  Scheduled appt for me to speak to pt on 04/12/2024 at 10:00 am to review pre transplant education as she believes he has watched the videos. Explained I have not yet received a coronary angiogram and the note I have from the dentist states he was a no show - she will look into these things.     Faxed PRA order to dialysis today, called and spoke with them - will draw at next opportunity. Also, emailed [facility] to send mailers directly to the dialysis unit for this patient.

## 2024-04-12 ENCOUNTER — TELEPHONE (OUTPATIENT)
Dept: TRANSPLANT | Facility: CLINIC | Age: 64
End: 2024-04-12
Payer: MEDICAID

## 2024-04-12 ENCOUNTER — DOCUMENTATION ONLY (OUTPATIENT)
Dept: TRANSPLANT | Facility: CLINIC | Age: 64
End: 2024-04-12
Payer: MEDICAID

## 2024-04-12 NOTE — TELEPHONE ENCOUNTER
Called patient today and spoke with both himself and Fouzia ( health coordinator ) at [facility].  We reviewed the pre transplant patient education, please see my other note from today for this. Reviewed our Program does recommend patient's receive the COVID vaccination. Pt responding today he is aware of this but he is declining the COVID vaccination and accepts the risks of then developing a serious COVID infection when on immunosuppression. Also reviewed pt has lost weight intentionally, per pt today's weight was 219 pounds which equals a BMI of 32. Pt stating he is taking victoza daily and novolin N 60 units every morning and 50 units every evening.  We reviewed pending items at this time for evaluation are:   Dental note from earlier this week needs to be faxed here - Fouzia to do.   Colonoscopy from within the last year needs to be faxed here - Fouzia to do.   Coronary angiogram needs to be done - Fouzia will speak with cardiologist   Fouzia said she put signed Receipt of Info consent in the regular mail yesterday.   Pt to RTC when all of the above is done to our Center for [facility] surg appt to approve weight loss.     Fouzia to let me know when all is done.

## 2024-04-12 NOTE — PROGRESS NOTES
Kidney, Pancreas Transplant Evaluation - 5/8/2023  Olayinka Arciniega and Fouzia the health coordinator both confirmed that he has viewed  the [facility] pre-transplant patient videos.  Neither had any questions at this time.      Content reviewed:  Living Donation and how to access that program. Both confirmed they have the registration site and our main office number to provide to donors.   Paired exchange, Direct donation. Pt expressed good understanding of both.   Kidney Donor Profile Index (KDPI). Pt has already signed with a no response.   Waiting list issues (right to decline without penalty, high PHS risk donors, what to expect when called with an offer)   Hospital experience, length of stay, need to stay locally post-discharge (2-4 weeks). Norma explained that the Department of Human Resources arranges for this.   Surgical video viewed.                          Post-surgery lifting and driving restrictions  Post-transplant routines, frequency of lab work and clinic visits  Need to stay locally post-discharge (2-4 weeks)  Role of Transplant Coordinator    Participants were informed of the benefits of transplant as well as potential risks such as infection, cancer, and death.  The need for total adherence with immunosuppression medications and following transplant regimens was stressed.  The overall evaluation/approval/listing process was reviewed.        The patient was provided with the following documents:    What You Need to Know About a Kidney Transplant  Adult Kidney Transplant - A Guide for Patients  SRTR Data Sheet - Kidney  Brochure - Kidney Allocation  SRTR Data Sheet - Kidney/Pancreas  Brochure - SPK  Brochure - Multiple Listing and Waiting Time Transfer  What Every Patient Needs to Know (UNOS)  UNOS Facts and Figures  Finding a Donor    Pt and Norma expressed very good understanding of all.

## 2024-04-23 ENCOUNTER — LAB (OUTPATIENT)
Dept: LAB | Facility: CLINIC | Age: 64
End: 2024-04-23
Payer: MEDICAID

## 2024-04-23 ENCOUNTER — TRANSFERRED RECORDS (OUTPATIENT)
Dept: HEALTH INFORMATION MANAGEMENT | Facility: CLINIC | Age: 64
End: 2024-04-23
Payer: MEDICAID

## 2024-04-23 ENCOUNTER — TELEPHONE (OUTPATIENT)
Dept: TRANSPLANT | Facility: CLINIC | Age: 64
End: 2024-04-23
Payer: MEDICAID

## 2024-04-23 DIAGNOSIS — Z76.82 AWAITING ORGAN TRANSPLANT: ICD-10-CM

## 2024-04-23 PROCEDURE — 86832 HLA CLASS I HIGH DEFIN QUAL: CPT

## 2024-04-23 PROCEDURE — 86833 HLA CLASS II HIGH DEFIN QUAL: CPT

## 2024-04-23 NOTE — TELEPHONE ENCOUNTER
Spoke with [facility] [place] [facility]  today, updated her on eval status. She will send most recent provider notes to our Office. She stated pt is very compliant and very stable on dialysis.

## 2024-05-06 LAB
PROTOCOL CUTOFF: NORMAL
SA 1  COMMENTS: NORMAL
SA 1 CELL: NORMAL
SA 1 TEST METHOD: NORMAL
SA 2 CELL: NORMAL
SA 2 COMMENTS: NORMAL
SA 2 TEST METHOD: NORMAL
SA1 HI RISK ABY: NORMAL
SA1 MOD RISK ABY: NORMAL
SA2 HI RISK ABY: NORMAL
SA2 MOD RISK ABY: NORMAL
UNACCEPTABLE ANTIGENS: NORMAL
UNOS CPRA: 64

## 2024-05-09 ENCOUNTER — TELEPHONE (OUTPATIENT)
Dept: TRANSPLANT | Facility: CLINIC | Age: 64
End: 2024-05-09
Payer: MEDICAID

## 2024-05-09 NOTE — TELEPHONE ENCOUNTER
Spoke with local cardiologist, Dr. Benny White's nurse from [facility]. Question from Dr. White if we still wanted left heart cath with normal nuclear stress test done earlier this year. Explained I will ask Transplant Team to review. Nurse provided call back number for their clinic at [phone] and to ask for Dr. White's nurse.

## 2024-05-16 ENCOUNTER — DOCUMENTATION ONLY (OUTPATIENT)
Dept: TRANSPLANT | Facility: CLINIC | Age: 64
End: 2024-05-16
Payer: MEDICAID

## 2024-05-16 NOTE — CONFIDENTIAL NOTE
Spoke to RN for Dr White at [facility]. [phone].  Explained to her we discussed the negative stress test Olayinka had 1/3/2024. Our transplant team would still like him to have a coronary angiogram. Aurelia will be in contact with [facility] to arrange.

## 2024-07-16 ENCOUNTER — LAB (OUTPATIENT)
Dept: LAB | Facility: CLINIC | Age: 64
End: 2024-07-16
Payer: MEDICAID

## 2024-07-16 DIAGNOSIS — Z76.82 AWAITING ORGAN TRANSPLANT: ICD-10-CM

## 2024-07-16 PROCEDURE — 86832 HLA CLASS I HIGH DEFIN QUAL: CPT

## 2024-07-16 PROCEDURE — 86833 HLA CLASS II HIGH DEFIN QUAL: CPT

## 2024-07-25 ENCOUNTER — TRANSFERRED RECORDS (OUTPATIENT)
Dept: HEALTH INFORMATION MANAGEMENT | Facility: CLINIC | Age: 64
End: 2024-07-25

## 2024-08-07 ENCOUNTER — TRANSFERRED RECORDS (OUTPATIENT)
Dept: HEALTH INFORMATION MANAGEMENT | Facility: CLINIC | Age: 64
End: 2024-08-07

## 2024-08-09 ENCOUNTER — TRANSFERRED RECORDS (OUTPATIENT)
Dept: HEALTH INFORMATION MANAGEMENT | Facility: CLINIC | Age: 64
End: 2024-08-09

## 2024-08-15 ENCOUNTER — TRANSFERRED RECORDS (OUTPATIENT)
Dept: HEALTH INFORMATION MANAGEMENT | Facility: CLINIC | Age: 64
End: 2024-08-15
Payer: MEDICAID

## 2024-09-03 ENCOUNTER — TELEPHONE (OUTPATIENT)
Dept: TRANSPLANT | Facility: CLINIC | Age: 64
End: 2024-09-03
Payer: MEDICAID

## 2024-09-03 NOTE — TELEPHONE ENCOUNTER
Reviewed chart for purpose of pre kidney pancreas transplant evaluation status confirmation.  Pt still needs Receipt of Info consent signed ( Norma regular mailed to our Office 04/12/2024 however it appears we did not receive them ). Needs CT and aorto iliac artery US reviewed at Image Review - images need to be pushed here first. Need last 3 provider notes from dialysis - called dialysis today and requested these. Noted pt was just seen in ED a few weeks ago for chest pain, slightly elevated troponins.      Called Nan (  at [facility] ) and spoke to her.  I asked her to have Norma ( patient's primary nurse ) call me at her convenience, Nan will send a message to Norma today.

## 2024-09-05 ENCOUNTER — TELEPHONE (OUTPATIENT)
Dept: TRANSPLANT | Facility: CLINIC | Age: 64
End: 2024-09-05
Payer: MEDICAID

## 2024-09-05 NOTE — TELEPHONE ENCOUNTER
Spoke with Norma MONROE at [facility] today.  Norma states pt is doing well, very stable and has lost weight with diet and exercise. Norma will fax over general practitioner notes from [facility] including current medication list.  I explained pt still needs to sign Receipt of Info consent and I will have this emailed to her again now.  Explained will review at Selection Committee next week. Norma expressed excellent understanding of all and was in good agreement with the plan.

## 2024-09-26 ENCOUNTER — TEAM CONFERENCE (OUTPATIENT)
Dept: TRANSPLANT | Facility: CLINIC | Age: 64
End: 2024-09-26
Payer: MEDICAID

## 2024-09-26 NOTE — TELEPHONE ENCOUNTER
Image Review Meeting on 09/24/2024    ATTENDEES: Dr. Sd Peters    IMAGES REVIEWED: CTabd pelvic 07/21/2023 and US 07/18/23    DECISION: Target vessel status is okay to proceed with kidney and pancreas transplant.     INCIDENTALS: No

## 2024-10-15 ENCOUNTER — LAB (OUTPATIENT)
Dept: LAB | Facility: CLINIC | Age: 64
End: 2024-10-15
Payer: MEDICAID

## 2024-10-15 DIAGNOSIS — Z76.82 AWAITING ORGAN TRANSPLANT: ICD-10-CM

## 2024-10-15 PROCEDURE — 86833 HLA CLASS II HIGH DEFIN QUAL: CPT

## 2024-10-15 PROCEDURE — 86832 HLA CLASS I HIGH DEFIN QUAL: CPT

## 2024-10-30 LAB
PROTOCOL CUTOFF: NORMAL
SA 1  COMMENTS: NORMAL
SA 1 CELL: NORMAL
SA 1 TEST METHOD: NORMAL
SA 2 CELL: NORMAL
SA 2 COMMENTS: NORMAL
SA 2 TEST METHOD: NORMAL
SA1 HI RISK ABY: NORMAL
SA1 MOD RISK ABY: NORMAL
SA2 HI RISK ABY: NORMAL
SA2 MOD RISK ABY: NORMAL
UNACCEPTABLE ANTIGENS: NORMAL
UNOS CPRA: 69

## 2024-12-30 ENCOUNTER — LAB (OUTPATIENT)
Dept: LAB | Facility: CLINIC | Age: 64
End: 2024-12-30
Payer: MEDICAID

## 2024-12-30 DIAGNOSIS — Z76.82 AWAITING ORGAN TRANSPLANT: ICD-10-CM

## 2025-03-24 ENCOUNTER — TELEPHONE (OUTPATIENT)
Dept: TRANSPLANT | Facility: CLINIC | Age: 65
End: 2025-03-24
Payer: MEDICAID

## 2025-03-24 NOTE — TELEPHONE ENCOUNTER
Received call from Jade of social work at [facility]. She was wondering where things are at for Olayinka. He was to lose 20 lbs or get to BMI<35. She reports he is at BMI of 31.31. Last dental was 2023 and she will check if he has been seen since. Will message coordinator if she wants Olayinka to come back in for an abdominal assessment with a surgeon since the weight loss.

## 2025-03-25 ENCOUNTER — LAB (OUTPATIENT)
Dept: LAB | Facility: CLINIC | Age: 65
End: 2025-03-25
Payer: MEDICAID

## 2025-03-25 DIAGNOSIS — Z76.82 AWAITING ORGAN TRANSPLANT: ICD-10-CM

## 2025-04-01 ENCOUNTER — TELEPHONE (OUTPATIENT)
Dept: ENDOCRINOLOGY | Facility: CLINIC | Age: 65
End: 2025-04-01

## 2025-04-01 ENCOUNTER — VIRTUAL VISIT (OUTPATIENT)
Dept: ENDOCRINOLOGY | Facility: CLINIC | Age: 65
End: 2025-04-01
Payer: MEDICAID

## 2025-04-01 VITALS — HEIGHT: 68 IN | WEIGHT: 214 LBS | BODY MASS INDEX: 32.43 KG/M2

## 2025-04-01 DIAGNOSIS — Z79.4 TYPE 2 DIABETES MELLITUS WITH STAGE 5 CHRONIC KIDNEY DISEASE NOT ON CHRONIC DIALYSIS, WITH LONG-TERM CURRENT USE OF INSULIN (H): Primary | ICD-10-CM

## 2025-04-01 DIAGNOSIS — N18.5 TYPE 2 DIABETES MELLITUS WITH STAGE 5 CHRONIC KIDNEY DISEASE NOT ON CHRONIC DIALYSIS, WITH LONG-TERM CURRENT USE OF INSULIN (H): Primary | ICD-10-CM

## 2025-04-01 DIAGNOSIS — E11.22 TYPE 2 DIABETES MELLITUS WITH STAGE 5 CHRONIC KIDNEY DISEASE NOT ON CHRONIC DIALYSIS, WITH LONG-TERM CURRENT USE OF INSULIN (H): Primary | ICD-10-CM

## 2025-04-01 PROCEDURE — 1126F AMNT PAIN NOTED NONE PRSNT: CPT | Mod: 95 | Performed by: INTERNAL MEDICINE

## 2025-04-01 PROCEDURE — 98001 SYNCH AUDIO-VIDEO NEW LOW 30: CPT | Performed by: INTERNAL MEDICINE

## 2025-04-01 PROCEDURE — G2211 COMPLEX E/M VISIT ADD ON: HCPCS | Performed by: INTERNAL MEDICINE

## 2025-04-01 ASSESSMENT — PAIN SCALES - GENERAL: PAINLEVEL_OUTOF10: NO PAIN (0)

## 2025-04-01 NOTE — PROGRESS NOTES
"    New Medical Weight Management Consult    PATIENT:  Olayinka Arciniega  MRN:         [id number]  :         1960  SARA:         2025    Dear PCP,    I had the pleasure of seeing your patient, Olayinka Arciniega. Full intake/assessment was done to determine barriers to weight loss success and develop a treatment plan. Olayinka Arciniega is a 65 year old male interested in treatment of medical problems associated with excess weight. He has a height of 5' 8\", a weight of 214 lbs 0 oz, and the calculated Body mass index is 32.54 kg/m .    He has the following co-morbidities: type 2 diabetes, ESRD on HD (x3 times per week), diabetic retinopathy, obesity, cervical DJD, RLS, hyperlipidemia, psoriasis, GERD, hx of polysubstance abuse (alcohol, marijuana, cocaine, amphetamine, barbiturates), pedophillic disorder, depression, head injury    Patient is referred for pre-evaluation for kidney and pancreas transplant.    Patient has type 2 diabetes with ESRD  Current diabetic regimen:  NPH 50 units daily   Ozempic 0.5 mg weekly -- for the past 3 weeks. No side effects.  A1c 7.2%  Lacey 3 sensor -- . Lowest 69. No hypoglycemia.     Reported weight 214 lbs. He said transplant team wants him to be less than 222 lbs.     Eating: eat twice a day, maybe eat more on the weekend. Don't eat after 7 pm  Exercise: walking    Current treatment facility -- [facility] sex offender program  Fax [phone]   Attn: health services nurse    PAST MEDICAL HISTORY:  Past Medical History:   Diagnosis Date    Diabetes mellitus, type 2 (H)     Diabetic neuropathy (H)     ESRD (end stage renal disease) on dialysis (H)     Hypertension     Mixed hyperlipidemia     Secondary hyperparathyroidism     SNHL (sensory-neural hearing loss), asymmetrical        MEDICATIONS:   Current Outpatient Medications   Medication Sig Dispense Refill    atorvastatin (LIPITOR) 40 MG tablet Take 40 mg by mouth At Bedtime      calcitRIOL (ROCALTROL) 0.5 MCG " "capsule Take 1 capsule by mouth daily      cholecalciferol (VITAMIN D3) 25 mcg (1000 units) capsule Take 2,000 Units by mouth      famotidine (PEPCID) 20 MG tablet Take 20 mg by mouth      furosemide (LASIX) 40 MG tablet Take 80 mg by mouth      insulin  UNIT/ML vial Inject 80 Units Subcutaneous      liraglutide (VICTOZA) 18 MG/3ML solution Inject 1.8 mg Subcutaneous      lisinopril (ZESTRIL) 10 MG tablet Take 1 tablet by mouth daily      polyvinyl alcohol (LIQUIFILM TEARS) 1.4 % ophthalmic solution APPLY 1 DROP INTO EYE(S) AS DIRECTED UP TO 6-8 TIMES A DAY      tamsulosin (FLOMAX) 0.4 MG capsule Take 0.4 mg by mouth      triamcinolone (KENALOG) 0.1 % external ointment       Urea 20 % OINT Apply 1 Dose topically         ALLERGIES:   Allergies   Allergen Reactions    Ibuprofen        PHYSICAL EXAM:  Ht 1.727 m (5' 8\")   Wt 97.1 kg (214 lb)   BMI 32.54 kg/m    Wt Readings from Last 4 Encounters:   04/01/25 97.1 kg (214 lb)   05/08/23 112 kg (247 lb)   03/09/23 113.1 kg (249 lb 6.4 oz)     A & O x 3  HEENT: NCAT  Respirations unlabored  Location of obesity: Mixed Obesity    ASSESSMENT/PLAN:  Olayinka is a patient with mature onset obesity with significant element of familial/genetic influence and with current health consequences. He does not need aggressive weight loss plan.      His problem is complicated by poor lifestyle choices    His ability to lose weight is impacted by physical impairment.    PLAN:    Decrease portion sizes  Volumetrics eating plan  Calorie/low fat diet  Meal planning  Increase activity     Diabetes   Ancillary testing:  N/A. Frequent home glucose monitoring with report to nurse as normal weight decreases.   Food Plan:  Reduced calorie and Low carbohydrate.  Activity Plan:  Exercise after meals.  Supplementary:  N/A.  Medication:      Recommend to increase Ozempic to 1.0 mg weekly and reduce NPH to 30-40 units daily according to CGM data  Patient has NP/PA at the treatment facility and " he will discuss with them.    FOLLOW-UP:   PRN.    Joined the call at 4/1/2025, 10:40:40 am.  Left the call at 4/1/2025, 10:53:56 am.  You were on the call for 13 minutes 15 seconds .      Sincerely,    Jonah Ley MD

## 2025-04-01 NOTE — LETTER
"2025       RE: Olayinka Arciniega  [facility]  [address]  [place] [address]     Dear Colleague,    Thank you for referring your patient, Olayinka Arciniega, to the [facility] [place] [facility]. Please see a copy of my visit note below.        New Medical Weight Management Consult    PATIENT:  Olayinka Arciniega  MRN:         [id number]  :         1960  SARA:         2025    Dear PCP,    I had the pleasure of seeing your patient, Olayinka Arciniega. Full intake/assessment was done to determine barriers to weight loss success and develop a treatment plan. Olayinka Arciniega is a 65 year old male interested in treatment of medical problems associated with excess weight. He has a height of 5' 8\", a weight of 214 lbs 0 oz, and the calculated Body mass index is 32.54 kg/m .    He has the following co-morbidities: type 2 diabetes, ESRD on HD (x3 times per week), diabetic retinopathy, obesity, cervical DJD, RLS, hyperlipidemia, psoriasis, GERD, hx of polysubstance abuse (alcohol, marijuana, cocaine, amphetamine, barbiturates), pedophillic disorder, depression, head injury    Patient is referred for pre-evaluation for kidney and pancreas transplant.    Patient has type 2 diabetes with ESRD  Current diabetic regimen:  NPH 50 units daily   Ozempic 0.5 mg weekly -- for the past 3 weeks. No side effects.  A1c 7.2%  Lacey 3 sensor -- . Lowest 69. No hypoglycemia.     Reported weight 214 lbs. He said transplant team wants him to be less than 222 lbs.     Eating: eat twice a day, maybe eat more on the weekend. Don't eat after 7 pm  Exercise: walking    Current treatment facility -- [facility] [place] [facility] offender [facility]  Fax [phone]   Attn: health services nurse    PAST MEDICAL HISTORY:  Past Medical History:   Diagnosis Date     Diabetes mellitus, type 2 (H)      Diabetic neuropathy (H)      ESRD (end stage renal " "disease) on dialysis (H)      Hypertension      Mixed hyperlipidemia      Secondary hyperparathyroidism      SNHL (sensory-neural hearing loss), asymmetrical        MEDICATIONS:   Current Outpatient Medications   Medication Sig Dispense Refill     atorvastatin (LIPITOR) 40 MG tablet Take 40 mg by mouth At Bedtime       calcitRIOL (ROCALTROL) 0.5 MCG capsule Take 1 capsule by mouth daily       cholecalciferol (VITAMIN D3) 25 mcg (1000 units) capsule Take 2,000 Units by mouth       famotidine (PEPCID) 20 MG tablet Take 20 mg by mouth       furosemide (LASIX) 40 MG tablet Take 80 mg by mouth       insulin  UNIT/ML vial Inject 80 Units Subcutaneous       liraglutide (VICTOZA) 18 MG/3ML solution Inject 1.8 mg Subcutaneous       lisinopril (ZESTRIL) 10 MG tablet Take 1 tablet by mouth daily       polyvinyl alcohol (LIQUIFILM TEARS) 1.4 % ophthalmic solution APPLY 1 DROP INTO EYE(S) AS DIRECTED UP TO 6-8 TIMES A DAY       tamsulosin (FLOMAX) 0.4 MG capsule Take 0.4 mg by mouth       triamcinolone (KENALOG) 0.1 % external ointment        Urea 20 % OINT Apply 1 Dose topically         ALLERGIES:   Allergies   Allergen Reactions     Ibuprofen        PHYSICAL EXAM:  Ht 1.727 m (5' 8\")   Wt 97.1 kg (214 lb)   BMI 32.54 kg/m    Wt Readings from Last 4 Encounters:   04/01/25 97.1 kg (214 lb)   05/08/23 112 kg (247 lb)   03/09/23 113.1 kg (249 lb 6.4 oz)     A & O x 3  HEENT: NCAT  Respirations unlabored  Location of obesity: Mixed Obesity    ASSESSMENT/PLAN:  Olayinka is a patient with mature onset obesity with significant element of familial/genetic influence and with current health consequences. He does not need aggressive weight loss plan.      His problem is complicated by poor lifestyle choices    His ability to lose weight is impacted by physical impairment.    PLAN:    Decrease portion sizes  Volumetrics eating plan  Calorie/low fat diet  Meal planning  Increase activity     Diabetes   Ancillary testing:  N/A. " Frequent home glucose monitoring with report to nurse as normal weight decreases.   Food Plan:  Reduced calorie and Low carbohydrate.  Activity Plan:  Exercise after meals.  Supplementary:  N/A.  Medication:      Recommend to increase Ozempic to 1.0 mg weekly and reduce NPH to 30-40 units daily according to CGM data  Patient has NP/PA at the treatment facility and he will discuss with them.    FOLLOW-UP:   PRN.    Joined the call at 4/1/2025, 10:40:40 am.  Left the call at 4/1/2025, 10:53:56 am.  You were on the call for 13 minutes 15 seconds .      Sincerely,    Jonah Ley MD            Again, thank you for allowing me to participate in the care of your patient.      Sincerely,    Jonah Ley MD

## 2025-04-01 NOTE — TELEPHONE ENCOUNTER
General Call      Reason for Call:  link for appt    What are your questions or concerns:  patient is scheduled at 1030 today w/Dr Mckenzie & hasn't gotten a link yet. Please send link to [email or web address].     Date of last appointment with provider: n/a    Okay to leave a detailed message?: Yes at Other phone number: [phone]

## 2025-04-01 NOTE — NURSING NOTE
Is the patient currently in the state of [place]? YES    Visit mode:VIDEO    If the visit is dropped, the patient can be reconnected by: VIDEO VISIT: Send to e-mail at: No e-mail address on record  [email or web address]     Will anyone else be joining the visit? NO  (If patient encounters technical issues they should call [phone]    How would you like to obtain your AVS? MyChart    Are changes needed to the allergy or medication list? No    Are refills needed on medications prescribed by this physician? NO    Reason for visit: Consult    Ranjana CHAVEZ

## 2025-04-02 ENCOUNTER — DOCUMENTATION ONLY (OUTPATIENT)
Dept: ENDOCRINOLOGY | Facility: CLINIC | Age: 65
End: 2025-04-02
Payer: MEDICAID

## 2025-04-02 NOTE — PROGRESS NOTES
Per Dr. Mckenzie, faxed office notes to: Treatment facility -- [facility] [place] [facility]   Fax [phone]   Attn: health services nurse

## 2025-04-17 ENCOUNTER — TELEPHONE (OUTPATIENT)
Dept: TRANSPLANT | Facility: CLINIC | Age: 65
End: 2025-04-17
Payer: MEDICAID

## 2025-04-17 NOTE — TELEPHONE ENCOUNTER
Called Candi the  for [employer]. She is on vacation this week.Left her a voice message that I was calling  in regards to Olayinka Arciniega. Wanting to check on the status of his dental work and that he will need WL appointments especially with surgeon to evaluate his abdomen since the weight loss. Asked her to either return the call to me or Marta on Monday when she returns.

## 2025-04-29 ENCOUNTER — TELEPHONE (OUTPATIENT)
Dept: TRANSPLANT | Facility: CLINIC | Age: 65
End: 2025-04-29
Payer: MEDICAID

## 2025-04-29 DIAGNOSIS — Z76.82 ORGAN TRANSPLANT CANDIDATE: ICD-10-CM

## 2025-04-29 DIAGNOSIS — N18.6 ESRD (END STAGE RENAL DISEASE) (H): ICD-10-CM

## 2025-04-29 DIAGNOSIS — I10 HYPERTENSION: ICD-10-CM

## 2025-04-29 DIAGNOSIS — Z12.5 PROSTATE CANCER SCREENING: ICD-10-CM

## 2025-04-29 DIAGNOSIS — E11.9 DIABETES MELLITUS, TYPE 2 (H): Primary | ICD-10-CM

## 2025-04-29 NOTE — TELEPHONE ENCOUNTER
Tried to reach Rosemary nurse coordinator at [facility] [place] [facility] to discuss what remains for Olayinka in regards to completing his evaluation. Was only able to leave a voice message and asked for a return call.

## 2025-04-29 NOTE — TELEPHONE ENCOUNTER
Carol RN from [facility] [place] [facility] returned my call. She will fax Olayinka's dental clearance. Olayinka needs WL appointments especially since his weight loss. Carol would like our schedulers to call her  Candi at [phone]. WL orders placed and routed to scheduling.

## 2025-05-12 ENCOUNTER — MEDICAL CORRESPONDENCE (OUTPATIENT)
Dept: HEALTH INFORMATION MANAGEMENT | Facility: CLINIC | Age: 65
End: 2025-05-12
Payer: MEDICAID

## 2025-05-28 ENCOUNTER — DOCUMENTATION ONLY (OUTPATIENT)
Dept: TRANSPLANT | Facility: CLINIC | Age: 65
End: 2025-05-28
Payer: MEDICAID

## 2025-06-02 ENCOUNTER — LAB (OUTPATIENT)
Dept: LAB | Facility: CLINIC | Age: 65
End: 2025-06-02
Payer: MEDICAID

## 2025-06-02 DIAGNOSIS — N18.6 ESRD (END STAGE RENAL DISEASE) (H): ICD-10-CM

## 2025-06-02 DIAGNOSIS — E11.9 DIABETES MELLITUS, TYPE 2 (H): ICD-10-CM

## 2025-06-02 DIAGNOSIS — I10 HYPERTENSION: ICD-10-CM

## 2025-06-02 DIAGNOSIS — Z12.5 PROSTATE CANCER SCREENING: ICD-10-CM

## 2025-06-02 DIAGNOSIS — Z76.82 ORGAN TRANSPLANT CANDIDATE: ICD-10-CM

## 2025-06-17 ENCOUNTER — LAB (OUTPATIENT)
Dept: LAB | Facility: CLINIC | Age: 65
End: 2025-06-17
Payer: MEDICAID

## 2025-06-17 DIAGNOSIS — Z76.82 AWAITING ORGAN TRANSPLANT: ICD-10-CM

## 2025-06-17 LAB
PROTOCOL CUTOFF: NORMAL
SA 1  COMMENTS: NORMAL
SA 1 CELL: NORMAL
SA 1 TEST METHOD: NORMAL
SA 2 CELL: NORMAL
SA 2 CELL: NORMAL
SA 2 COMMENTS: NORMAL
SA 2 COMMENTS: NORMAL
SA 2 TEST METHOD: NORMAL
SA 2 TEST METHOD: NORMAL
SA1 HI RISK ABY: NORMAL
SA1 MOD RISK ABY: NORMAL
SA2 HI RISK ABY: NORMAL
SA2 HI RISK ABY: NORMAL
SA2 MOD RISK ABY: NORMAL
SA2 MOD RISK ABY: NORMAL
UNACCEPTABLE ANTIGENS: NORMAL
UNOS CPRA: 69

## 2025-06-17 PROCEDURE — 86832 HLA CLASS I HIGH DEFIN QUAL: CPT

## 2025-06-17 PROCEDURE — 86833 HLA CLASS II HIGH DEFIN QUAL: CPT

## 2025-07-14 ENCOUNTER — LAB (OUTPATIENT)
Dept: LAB | Facility: CLINIC | Age: 65
End: 2025-07-14
Attending: NURSE PRACTITIONER
Payer: MEDICAID

## 2025-07-14 ENCOUNTER — OFFICE VISIT (OUTPATIENT)
Dept: TRANSPLANT | Facility: CLINIC | Age: 65
End: 2025-07-14
Attending: NURSE PRACTITIONER
Payer: MEDICAID

## 2025-07-14 VITALS
OXYGEN SATURATION: 100 % | RESPIRATION RATE: 16 BRPM | HEIGHT: 67 IN | WEIGHT: 209.1 LBS | HEART RATE: 62 BPM | DIASTOLIC BLOOD PRESSURE: 99 MMHG | BODY MASS INDEX: 32.82 KG/M2 | SYSTOLIC BLOOD PRESSURE: 182 MMHG

## 2025-07-14 DIAGNOSIS — Z76.82 ORGAN TRANSPLANT CANDIDATE: ICD-10-CM

## 2025-07-14 DIAGNOSIS — E11.21 TYPE 2 DIABETES MELLITUS WITH DIABETIC NEPHROPATHY, WITHOUT LONG-TERM CURRENT USE OF INSULIN (H): ICD-10-CM

## 2025-07-14 DIAGNOSIS — E11.9 DIABETES MELLITUS, TYPE 2 (H): ICD-10-CM

## 2025-07-14 DIAGNOSIS — I10 HYPERTENSION: ICD-10-CM

## 2025-07-14 DIAGNOSIS — I15.0 RENOVASCULAR HYPERTENSION: ICD-10-CM

## 2025-07-14 DIAGNOSIS — N18.6 ESRD (END STAGE RENAL DISEASE) (H): ICD-10-CM

## 2025-07-14 DIAGNOSIS — Z12.5 PROSTATE CANCER SCREENING: ICD-10-CM

## 2025-07-14 LAB — PSA SERPL DL<=0.01 NG/ML-MCNC: 1.25 NG/ML (ref 0–4.5)

## 2025-07-14 PROCEDURE — G0103 PSA SCREENING: HCPCS | Performed by: PATHOLOGY

## 2025-07-14 PROCEDURE — 3080F DIAST BP >= 90 MM HG: CPT | Performed by: NURSE PRACTITIONER

## 2025-07-14 PROCEDURE — 99215 OFFICE O/P EST HI 40 MIN: CPT | Performed by: NURSE PRACTITIONER

## 2025-07-14 PROCEDURE — 3077F SYST BP >= 140 MM HG: CPT | Performed by: NURSE PRACTITIONER

## 2025-07-14 PROCEDURE — G0463 HOSPITAL OUTPT CLINIC VISIT: HCPCS | Performed by: NURSE PRACTITIONER

## 2025-07-14 PROCEDURE — 36415 COLL VENOUS BLD VENIPUNCTURE: CPT | Performed by: PATHOLOGY

## 2025-07-14 RX ORDER — CALCIUM ACETATE 667 MG/1
667 CAPSULE ORAL
COMMUNITY
Start: 2025-06-08

## 2025-07-14 RX ORDER — SEMAGLUTIDE 1.34 MG/ML
1 INJECTION, SOLUTION SUBCUTANEOUS
COMMUNITY
Start: 2025-07-07

## 2025-07-14 RX ORDER — ASPIRIN 81 MG/1
1 TABLET, COATED ORAL DAILY
COMMUNITY
Start: 2025-06-17

## 2025-07-14 RX ORDER — LIDOCAINE AND PRILOCAINE 25; 25 MG/G; MG/G
CREAM TOPICAL
COMMUNITY

## 2025-07-14 NOTE — NURSING NOTE
"Chief Complaint   Patient presents with    Transplant Waitlist Maintenance     Kidney/Pancreas transplant eval     Vital signs:      BP: (!) 182/99 Pulse: 62   Resp: 16 SpO2: 100 %     Height: 171 cm (5' 7.32\") Weight: 94.8 kg (209 lb 1.6 oz)  Estimated body mass index is 32.44 kg/m  as calculated from the following:    Height as of this encounter: 1.71 m (5' 7.32\").    Weight as of this encounter: 94.8 kg (209 lb 1.6 oz).      Andrea Rivera RN on 7/14/2025 at 12:02 PM    "

## 2025-07-14 NOTE — Clinical Note
2025      Olayinka Arciniega  [facility]  [address]  [facility] [address]      Dear Colleague,    Thank you for referring your patient, Olayinka Arciniega, to the [facility]. Please see a copy of my visit note below.    Transplant Surgery Consult Note    Medical record number: [id number]  YOB: 1960,   Consult requested by Dr. Telles for evaluation of kidney transplant candidacy.    Assessment and Recommendations: Mr. Arciniega is a good candidate for transplantation and has a good understanding of the risks and benefits of this approach to management of renal failure and diabetes. The following issues should be addressed prior to transplant:     Mr. Arciniega has Chronic renal failure due to diabetes mellitus type 2 whose condition is not expected to resolve, is expected to progress, and is expected to continue to develop related comorbid conditions.  Cardiology consult for cardiac risk stratification to be ordered: Yes  CT abdomen and pelvis without contrast to be ordered for assessment of vascular targets: Yes  Transplant listing labs ordered to include HLA, ABOx2, Cr, etc.  Dietician consult ordered: Yes  Social work consult ordered: Yes  Imaging reports reviewed:  Yes  Radiology images reviewed:Yes  Recipient suitable to move forward with work up of living donors:  Yes  Recipient on chronic anticoagulation? No      The majority of our visit was spent in counselling, discussing the medical and surgical risks of living or  donor kidney transplantation. I contrasted approximate wait time for living vs  donor kidneys from normal (0-85%) or higher (%) kidney donor profile index (KDPI) donors and their associated outcomes.Access to transplant will be impacted by living donor availability and overall candidacy for SPK, as well as the influence of blood type and degree of sensitization. We discussed advantages of preemptive transplant as well as  living donor kidney transplant, and graft and patient survival outcomes associated with these options. Potential surgical complications of kidney transplantation include bleeding, clotting, infection, wound complications, anastomotic failure and other issues such as cardiac complications, pneumonia, deep venous thrombosis, pulmonary embolism, post transplant diabetes and death. We discussed the need for protocol biopsy of the kidney and the possible need for a ureteral stent (and subsequent removal).  We also discussed differences in the average length of stay, recovery process, and posttransplant lab and monitoring protocol. We discussed the risk of graft rejection and recurrent diabetic nephropathy in the setting of poor glycemic control. I emphasized the need for strict immunosuppression adherence and the potential for complications of immunosuppression such as skin cancer or lymphoma, as well as a very low but not zero risk of donor-derived disease transmission risks (infection, cancer). Mr. Arciniega asked good questions and the patient's candidacy will be reviewed at our Multidisciplinary Selection Committee. Thank you for the opportunity to participate in Mr. Arciniega's care.      Gerda Briggs MD, MPH  PGY1 Plastic & Reconstructive Surgery   Rotating on Transplant Surgery    ---------------------------------------------------------------------------------------------------    HPI: Mr. Arciniega has chronic renal failure due to diabetes mellitus type 2. He has been on dialysis since March of 2023. At his last transplant surgery visit in 2025, he weighed 220. He currently weighs 209lbs. He has been eating smaller portions and walking more.  He no longer uses insulin as of last week. Prior to that, would use BID long acting insulin. The patient uses a continuous glucose monitor.  Daily blood glucoses range typically from 50 to 240s.  Hypoglyemic unawareness is not an issue now that he has discontinued insulin.   The diabetes is controlled.    Complications of diabetes include:    Retinopathy:  Yes. He does see an opthomologist regularly. He also has cataracts.   Neuropathy: Yes, in his hands and feet.   Gastroparesis:  No    The patient is on dialysis and has an AVF in his RUE. He receives dialysis on TuesThursSat. Last session was this last Saturday.   Has potential kidney donors:  No.  Interested in participation in paired exchange if a donor is willing: No    The patient has the following pertinent history:       No    Yes  Dialysis:    []      [x] via:       Blood Transfusion                  [x]      []  Number of units:   Most recently:  Pregnancy:    [x]      [] Number:       Previous Transplant:  [x]      [] Details:    Cancer    [x]      [] Comment:   Kidney stones   [x]      [] Comment:      Recurrent infections  [x]      []  Type:                  Bladder dysfunction  [x]      [] Cause:    Claudication   [x]      [] Distance:    Previous Amputation  [x]      [] Cause:     Chronic anticoagulation  [x]      [] Indication:  Presybeterian  No     Past Medical History:   Diagnosis Date    Diabetes mellitus, type 2 (H)     Diabetic neuropathy (H)     ESRD (end stage renal disease) on dialysis (H)     Hypertension     Mixed hyperlipidemia     Secondary hyperparathyroidism     SNHL (sensory-neural hearing loss), asymmetrical      Past Surgical History:   Procedure Laterality Date    COLONOSCOPY  2019    DIALYSIS FISTULA CREATION  09/2022     Family History   Problem Relation Age of Onset    Diabetes Mother     Myocardial Infarction Father 45    Diabetes Sister     Kidney failure Sister     Diabetes Sister     Diabetes Brother     Diabetes Brother     Diabetes Maternal Grandfather      Social History     Socioeconomic History    Marital status:      Spouse name: Not on file    Number of children: Not on file    Years of education: Not on file    Highest education level: Not on file   Occupational History     Not on file   Tobacco Use    Smoking status: Former     Current packs/day: 0.00     Types: Cigarettes     Quit date:      Years since quittin.5    Smokeless tobacco: Never    Tobacco comments:     4.5 packs per day for 12 years   Substance and Sexual Activity    Alcohol use: Not Currently    Drug use: Not Currently    Sexual activity: Not on file   Other Topics Concern    Not on file   Social History Narrative    Not on file     Social Drivers of Health     Financial Resource Strain: Not on file   Food Insecurity: Not on file   Transportation Needs: Not on file   Physical Activity: Not on file   Stress: Not on file   Social Connections: Not on file   Interpersonal Safety: Not At Risk (1/3/2025)    Received from [facility] and [facility] IP Custom IPV     Do you feel UNSAFE in any of your personal relationships with your family members or any other acquaintances?: No   Housing Stability: Not on file       ROS:   CONSTITUTIONAL:  No fevers or chills  EYES: negative for icterus  ENT:  negative for hearing loss, tinnitus and sore throat  RESPIRATORY:  negative for cough, sputum, dyspnea  CARDIOVASCULAR:  negative for chest pain   GASTROINTESTINAL:  negative for nausea, vomiting, diarrhea or constipation  GENITOURINARY:  negative for incontinence, dysuria, bladder emptying problems  HEME:  No easy bruising  INTEGUMENT:  negative for rash and pruritus  NEURO:  Negative for headache, seizure disorder    Allergies:   Allergies   Allergen Reactions    Ibuprofen Nausea       Medications:  Prescription Medications as of 2025         Rx Number Disp Refills Start End Last Dispensed Date Next Fill Date Owning Pharmacy    ASPIRIN LOW DOSE 81 MG EC tablet  -- -- 2025 --       Sig: Take 1 tablet by mouth daily.    Class: Historical    Route: Oral    atorvastatin (LIPITOR) 40 MG tablet  -- --  --       Sig: Take 40 mg by mouth At Bedtime    Class: Historical    Route: Oral    calcium  acetate (PHOSLO) 667 MG CAPS capsule  -- -- 6/8/2025 --       Sig: Take 667 mg by mouth 3 times daily (with meals).    Class: Historical    Route: Oral    famotidine (PEPCID) 20 MG tablet  -- --  --       Sig: Take 20 mg by mouth    Class: Historical    Route: Oral    lidocaine-prilocaine (EMLA) 2.5-2.5 % external cream  -- --  --       Sig: APPLY TOPICALLY 3 TIMES A WEEK ON DIALYSIS DAYS TO LAV LEFT ARM 30 MIN PRIOR TO LEAVING    Class: Historical    lisinopril (ZESTRIL) 10 MG tablet  -- -- 2/22/2023 --       Sig: Take 1 tablet by mouth daily    Class: Historical    Route: Oral    OZEMPIC, 1 MG/DOSE, 4 MG/3ML pen  -- -- 7/7/2025 --       Sig: Inject 1 mg subcutaneously every 7 days.    Class: Historical    Route: Subcutaneous    polyvinyl alcohol (LIQUIFILM TEARS) 1.4 % ophthalmic solution  -- -- 6/24/2022 --       Sig: APPLY 1 DROP INTO EYE(S) AS DIRECTED UP TO 6-8 TIMES A DAY    Class: Historical    tamsulosin (FLOMAX) 0.4 MG capsule  -- -- 9/13/2021 --       Sig: Take 0.4 mg by mouth    Class: Historical    Route: Oral    triamcinolone (KENALOG) 0.1 % external ointment  -- --  --       Class: Historical    Route: Topical    Urea 20 % OINT  -- -- 12/13/2021 --       Sig: Apply 1 Dose topically    Class: Historical    Route: Topical            Exam:   Pulse:  [62-67] 62  Resp:  [16] 16  BP: (182-195)/() 182/99  SpO2:  [100 %] 100 %  Appearance: in no apparent distress.   Skin: normal  Respiratory: easy respirations, no audible wheezing.  Abdomen: obese, no distention  Extremities: no extremity edema    Diagnostics:   Recent Results (from the past 4 weeks)   HLA Alejandra Class I, Single Antigen    Collection Time: 06/17/25 12:30 PM   Result Value Ref Range    SA 1 TEST METHOD SA EDTA FCS     SA 1 CELL Class I     SA1 HI RISK ALEJANDRA None     SA1 MOD RISK ALEJANDRA A:43     SA 1  COMMENTS        HLA PRA Test performed by modified testing procedure that may also include pretreatment of serum. Pretreatment may be the addition  of fetal calf serum, EDTA, and/or adsorption.  High-risk, MFI > 3,000.  Mod-risk, -3,000.   HLA Alejandra Class II, Single Antigen    Collection Time: 06/17/25 12:30 PM   Result Value Ref Range    SA 2 TEST METHOD SA EDTA FCS     SA 2 CELL Class II     SA2 HI RISK ALEJANDRA None     SA2 MOD RISK ALEJANDRA DR:8 12 13 14 17 18     SA 2 COMMENTS        HLA PRA Test performed by modified testing procedure that may also include pretreatment of serum. Pretreatment may be the addition of fetal calf serum, EDTA, and/or adsorption.  High-risk, MFI > 3,000.  Mod-risk, -3,000.   HLA Alejandra, CPRA    Collection Time: 06/17/25 12:30 PM   Result Value Ref Range    PROTOCOL CUTOFF Plan A, 500 mfi cumulative     UNOS CPRA 69     UNACCEPTABLE ANTIGENS A:43 DR:8 11 12 13 14 17 18    Prostate spec antigen screen [[id number]]    Collection Time: 07/14/25 10:47 AM   Result Value Ref Range    Prostate Specific Antigen Screen 1.25 0.00 - 4.50 ng/mL     UNOS CPRA   Date Value Ref Range Status   06/17/2025 69  Final           Again, thank you for allowing me to participate in the care of your patient.        Sincerely,        Kel Sierra MD    Electronically signed

## 2025-07-14 NOTE — NURSING NOTE
Chief Complaint   Patient presents with    Transplant Waitlist Maintenance     Kidney/Pancreas transplant eval

## 2025-07-14 NOTE — PROGRESS NOTES
Transplant Surgery Consult Note    Medical record number: [id number]  YOB: 1960,   Consult requested by Dr. Telles for evaluation of kidney transplant candidacy.    Assessment and Recommendations: Mr. Arciniega is a good candidate for transplantation and has a good understanding of the risks and benefits of this approach to management of renal failure and diabetes. The following issues should be addressed prior to transplant:     Mr. Arciniega has Chronic renal failure due to diabetes mellitus type 2 whose condition is not expected to resolve, is expected to progress, and is expected to continue to develop related comorbid conditions.  Cardiology consult for cardiac risk stratification to be ordered: Yes  CT abdomen and pelvis without contrast to be ordered for assessment of vascular targets: Yes  Transplant listing labs ordered to include HLA, ABOx2, Cr, etc.  Dietician consult ordered: Yes  Social work consult ordered: Yes  Imaging reports reviewed:  Yes  Radiology images reviewed:Yes  Recipient suitable to move forward with work up of living donors:  Yes  Recipient on chronic anticoagulation? No      The majority of our visit was spent in counselling, discussing the medical and surgical risks of living or  donor kidney transplantation. I contrasted approximate wait time for living vs  donor kidneys from normal (0-85%) or higher (%) kidney donor profile index (KDPI) donors and their associated outcomes.Access to transplant will be impacted by living donor availability and overall candidacy for SPK, as well as the influence of blood type and degree of sensitization. We discussed advantages of preemptive transplant as well as living donor kidney transplant, and graft and patient survival outcomes associated with these options. Potential surgical complications of kidney transplantation include bleeding, clotting, infection, wound complications, anastomotic failure and other issues  such as cardiac complications, pneumonia, deep venous thrombosis, pulmonary embolism, post transplant diabetes and death. We discussed the need for protocol biopsy of the kidney and the possible need for a ureteral stent (and subsequent removal).  We also discussed differences in the average length of stay, recovery process, and posttransplant lab and monitoring protocol. We discussed the risk of graft rejection and recurrent diabetic nephropathy in the setting of poor glycemic control. I emphasized the need for strict immunosuppression adherence and the potential for complications of immunosuppression such as skin cancer or lymphoma, as well as a very low but not zero risk of donor-derived disease transmission risks (infection, cancer). Mr. Arciniega asked good questions and the patient's candidacy will be reviewed at our Multidisciplinary Selection Committee. Thank you for the opportunity to participate in Mr. Arciniega's care.      Gerda Briggs MD, MPH  PGY1 Plastic & Reconstructive Surgery   Rotating on Transplant Surgery    ---------------------------------------------------------------------------------------------------    HPI: Mr. Arciniega has chronic renal failure due to diabetes mellitus type 2. He has been on dialysis since March of 2023. At his last transplant surgery visit in 2025, he weighed 220. He currently weighs 209lbs. He has been eating smaller portions and walking more.  He no longer uses insulin as of last week. Prior to that, would use BID long acting insulin. The patient uses a continuous glucose monitor.  Daily blood glucoses range typically from 50 to 240s.  Hypoglyemic unawareness is not an issue now that he has discontinued insulin.  The diabetes is controlled.    Complications of diabetes include:    Retinopathy:  Yes. He does see an opthomologist regularly. He also has cataracts.   Neuropathy: Yes, in his hands and feet.   Gastroparesis:  No    The patient is on dialysis and has an AVF  in his RUE. He receives dialysis on [facility]. Last session was this last Saturday.   Has potential kidney donors:  No.  Interested in participation in paired exchange if a donor is willing: No    The patient has the following pertinent history:       No    Yes  Dialysis:    []      [x] via:       Blood Transfusion                  [x]      []  Number of units:   Most recently:  Pregnancy:    [x]      [] Number:       Previous Transplant:  [x]      [] Details:    Cancer    [x]      [] Comment:   Kidney stones   [x]      [] Comment:      Recurrent infections  [x]      []  Type:                  Bladder dysfunction  [x]      [] Cause:    Claudication   [x]      [] Distance:    Previous Amputation  [x]      [] Cause:     Chronic anticoagulation  [x]      [] Indication:  Restorationism  No     Past Medical History:   Diagnosis Date    Diabetes mellitus, type 2 (H)     Diabetic neuropathy (H)     ESRD (end stage renal disease) on dialysis (H)     Hypertension     Mixed hyperlipidemia     Secondary hyperparathyroidism     SNHL (sensory-neural hearing loss), asymmetrical      Past Surgical History:   Procedure Laterality Date    COLONOSCOPY      DIALYSIS FISTULA CREATION  2022     Family History   Problem Relation Age of Onset    Diabetes Mother     Myocardial Infarction Father 45    Diabetes Sister     Kidney failure Sister     Diabetes Sister     Diabetes Brother     Diabetes Brother     Diabetes Maternal Grandfather      Social History     Socioeconomic History    Marital status:      Spouse name: Not on file    Number of children: Not on file    Years of education: Not on file    Highest education level: Not on file   Occupational History    Not on file   Tobacco Use    Smoking status: Former     Current packs/day: 0.00     Types: Cigarettes     Quit date:      Years since quittin.5    Smokeless tobacco: Never    Tobacco comments:     4.5 packs per day for 12 years   Substance and Sexual  Activity    Alcohol use: Not Currently    Drug use: Not Currently    Sexual activity: Not on file   Other Topics Concern    Not on file   Social History Narrative    Not on file     Social Drivers of Health     Financial Resource Strain: Not on file   Food Insecurity: Not on file   Transportation Needs: Not on file   Physical Activity: Not on file   Stress: Not on file   Social Connections: Not on file   Interpersonal Safety: Not At Risk (1/3/2025)    Received from [facility]     IP Custom IPV     Do you feel UNSAFE in any of your personal relationships with your family members or any other acquaintances?: No   Housing Stability: Not on file       ROS:   CONSTITUTIONAL:  No fevers or chills  EYES: negative for icterus  ENT:  negative for hearing loss, tinnitus and sore throat  RESPIRATORY:  negative for cough, sputum, dyspnea  CARDIOVASCULAR:  negative for chest pain   GASTROINTESTINAL:  negative for nausea, vomiting, diarrhea or constipation  GENITOURINARY:  negative for incontinence, dysuria, bladder emptying problems  HEME:  No easy bruising  INTEGUMENT:  negative for rash and pruritus  NEURO:  Negative for headache, seizure disorder    Allergies:   Allergies   Allergen Reactions    Ibuprofen Nausea       Medications:  Prescription Medications as of 7/14/2025         Rx Number Disp Refills Start End Last Dispensed Date Next Fill Date Owning Pharmacy    ASPIRIN LOW DOSE 81 MG EC tablet  -- -- 6/17/2025 --       Sig: Take 1 tablet by mouth daily.    Class: Historical    Route: Oral    atorvastatin (LIPITOR) 40 MG tablet  -- --  --       Sig: Take 40 mg by mouth At Bedtime    Class: Historical    Route: Oral    calcium acetate (PHOSLO) 667 MG CAPS capsule  -- -- 6/8/2025 --       Sig: Take 667 mg by mouth 3 times daily (with meals).    Class: Historical    Route: Oral    famotidine (PEPCID) 20 MG tablet  -- --  --       Sig: Take 20 mg by mouth    Class: Historical    Route:  Oral    lidocaine-prilocaine (EMLA) 2.5-2.5 % external cream  -- --  --       Sig: APPLY TOPICALLY 3 TIMES A WEEK ON DIALYSIS DAYS TO LAV LEFT ARM 30 MIN PRIOR TO LEAVING    Class: Historical    lisinopril (ZESTRIL) 10 MG tablet  -- -- 2/22/2023 --       Sig: Take 1 tablet by mouth daily    Class: Historical    Route: Oral    OZEMPIC, 1 MG/DOSE, 4 MG/3ML pen  -- -- 7/7/2025 --       Sig: Inject 1 mg subcutaneously every 7 days.    Class: Historical    Route: Subcutaneous    polyvinyl alcohol (LIQUIFILM TEARS) 1.4 % ophthalmic solution  -- -- 6/24/2022 --       Sig: APPLY 1 DROP INTO EYE(S) AS DIRECTED UP TO 6-8 TIMES A DAY    Class: Historical    tamsulosin (FLOMAX) 0.4 MG capsule  -- -- 9/13/2021 --       Sig: Take 0.4 mg by mouth    Class: Historical    Route: Oral    triamcinolone (KENALOG) 0.1 % external ointment  -- --  --       Class: Historical    Route: Topical    Urea 20 % OINT  -- -- 12/13/2021 --       Sig: Apply 1 Dose topically    Class: Historical    Route: Topical            Exam:   Pulse:  [62-67] 62  Resp:  [16] 16  BP: (182-195)/() 182/99  SpO2:  [100 %] 100 %  Appearance: in no apparent distress.   Skin: normal  Respiratory: easy respirations, no audible wheezing.  Abdomen: obese, no distention  Extremities: no extremity edema    Diagnostics:   Recent Results (from the past 4 weeks)   HLA Alejandra Class I, Single Antigen    Collection Time: 06/17/25 12:30 PM   Result Value Ref Range    SA 1 TEST METHOD SA EDTA FCS     SA 1 CELL Class I     SA1 HI RISK ALEJANDRA None     SA1 MOD RISK ALEJANDRA A:43     SA 1  COMMENTS        HLA PRA Test performed by modified testing procedure that may also include pretreatment of serum. Pretreatment may be the addition of fetal calf serum, EDTA, and/or adsorption.  High-risk, MFI > 3,000.  Mod-risk, -3,000.   HLA Alejandra Class II, Single Antigen    Collection Time: 06/17/25 12:30 PM   Result Value Ref Range    SA 2 TEST METHOD SA EDTA FCS     SA 2 CELL Class II     SA2 HI  RISK ALEJANDRA None     SA2 MOD RISK ALEJANDRA DR:8 12 13 14 17 18     SA 2 COMMENTS        HLA PRA Test performed by modified testing procedure that may also include pretreatment of serum. Pretreatment may be the addition of fetal calf serum, EDTA, and/or adsorption.  High-risk, MFI > 3,000.  Mod-risk, -3,000.   HLA Alejandra, CPRA    Collection Time: 06/17/25 12:30 PM   Result Value Ref Range    PROTOCOL CUTOFF Plan A, 500 mfi cumulative     UNOS CPRA 69     UNACCEPTABLE ANTIGENS A:43 DR:8 11 12 13 14 17 18    Prostate spec antigen screen [[id number]]    Collection Time: 07/14/25 10:47 AM   Result Value Ref Range    Prostate Specific Antigen Screen 1.25 0.00 - 4.50 ng/mL     UNOS CPRA   Date Value Ref Range Status   06/17/2025 69  Final

## 2025-07-14 NOTE — PROGRESS NOTES
TRANSPLANT NEPHROLOGY WAITLIST VISIT    Assessment and Plan:  # Kidney ***Transplant Wait List Evaluation: Patient is a {desc.:977701} candidate overall. Patient should {Wait List Status:025199} on the wait list.    # ESKD from presumed diabetes mellitus type 2: although doing OK on hemodialysis since 3/2023, he may benefit from a kidney transplant, ABO-B, no donors.      #Type 2 diabetes: A1c 6.8% ~ April , off insulin x 3 months**plus ozempic qweek.  113-145    # Cardiac Risk: ***  Angio -essencia:   HTN:140s , Hypotension at dialysis requiring volume back.      # Obesity: *** BMI 36.5 at eval with 20lb weight loss goal, down to 209lbs. 32.4, on Ozempic  x1 months    # Urinary retention: on flomax, difficult starting stream at times, feels emptying bladder.     # Resides in correctional facility: where staff administers all medications where he reports good compliance. Appreciate social work input.   # H/o polysubstance use (cocaine, marijuana): went to CD treatment at some point, quit in . Appreciate social work input.     #Insomnia: takes benadryl as a sleep aide  # Health Maintenance: Colonoscopy: Up to date -repeat 10 years, and Dental: Up to date     Discussed the risks and benefits of a transplant, including the risk of surgery and immunosuppression medications.    KDPI: We discussed approximate remaining wait time and how that is influenced by issues such as blood type and sensitization (PRA) and access to a living donor. I contrasted potential waiting time for living vs  donor kidneys from  normal (0-85%) or higher (%) kidney donor profile index (KDPI) donors and their associated outcomes. I {Would:081106} recommend Mr. Arciniega to consider kidneys from high KDPI donors. The reason for this decision is best summarized as: {KDPI REASON:424839166}.    {FV RENAL TX [facility] KIDNEY CANDIDATE WAITLIST (Optional):86036110}  Patient s overall re-evaluation may require further discussion in  the Transplant Program s multidisciplinary selection committee for a final recommendation on the patient s suitability for transplant.     Reason for Visit:  Olayinka Arciniega is a 65 year old male with ESKD from diabetes mellitus type 2, who presents for kidney transplant wait list evaluation.     Date of Initial Transplant Evaluation:  5/8/2023        Current Transplant Phase: Evaluation: Active  Official UNOS Listing Date:    Blood Type: B POS          cPRA: ***       Date of cPRA: ***  Transplant Coordinator: Marta Meza Transplant Office phone number [phone]  Primary Nephrologist: { :[phone]}.     Previous Medical Issues:  {FV RENAL TX OPTIONAL COMMENT (Optional):634120}     History of Present Illness:   ***         Interim Events: {FV Renal Tx None vs Blank w/ Indent:062097}          Kidney Disease: ***       Kidney Disease Dx: Diabetes mellitus type 2        On Dialysis: Yes, Date initiated: 3/2023 and Dialysis Type: [facility];       Primary Nephrologist: ***           Diabetes: ***       Diabetic Control: {FV Diabetes Control:457077}      Last HbA1c:        Hypoglycemic Unawareness: {YES WITH WILD CARD/NO:31273149}       New Issues: {YES WITH WILD CARD/NO:20442518}         Cardiac/Vascular Disease History:       Known CAD: {FV Renal Tx Known CAD w/ Asessments:606865}       Known PAD/Claudication Symptoms: {YES WITH WILD CARD/NO:73019732}       Known Heart Failure: {Cardiac Risk Factors:428859}       Arrhythmia:  No       Pulmonary Hypertension: {YES WITH WILD CARD/NO:71830688}        Valvular Disease: {YES WITH WILD CARD/NO:58405822}       Other: {Other Risk Factors:033005}       New Cardiac/Vascular Events: {YES WITH WILD CARD/NO:25323480}         Functional Capacity/Frailty:         Walking 1-2 miles/day. NO    # COVID Vaccination Up To Date: { :381778}         {FV RENAL TX OPTIONAL COMMENT (Optional):230260}    Other Pertinent Transplant Surgical Issues:  Recent Blood Transfusion: No  Previous  "Abdominal Transplant: No  Bladder Dysfunction: {YES WITH WILD CARD/NO:67677516}  Chronic/Recurrent Infections: No  Chronic Anticoagulation: No  Jehovah s Witness: No       Active Problem List:   Patient Active Problem List   Diagnosis    Diabetes mellitus, type 2 (H)    ESRD (end stage renal disease) on dialysis (H)    Diabetic neuropathy (H)    SNHL (sensory-neural hearing loss), asymmetrical    Morbid obesity (H)    Hypertension    Secondary hyperparathyroidism       Personal History:  *** former smoker, alcohol, drugs       Allergies:  Allergies   Allergen Reactions    Ibuprofen Nausea        Medications:  Current Outpatient Medications   Medication Sig Dispense Refill    ASPIRIN LOW DOSE 81 MG EC tablet Take 1 tablet by mouth daily.      atorvastatin (LIPITOR) 40 MG tablet Take 40 mg by mouth At Bedtime      calcium acetate (PHOSLO) 667 MG CAPS capsule Take 667 mg by mouth 3 times daily (with meals).      famotidine (PEPCID) 20 MG tablet Take 20 mg by mouth      lidocaine-prilocaine (EMLA) 2.5-2.5 % external cream APPLY TOPICALLY 3 TIMES A WEEK ON DIALYSIS DAYS TO LAV LEFT ARM 30 MIN PRIOR TO LEAVING      lisinopril (ZESTRIL) 10 MG tablet Take 1 tablet by mouth daily      OZEMPIC, 1 MG/DOSE, 4 MG/3ML pen Inject 1 mg subcutaneously every 7 days.      polyvinyl alcohol (LIQUIFILM TEARS) 1.4 % ophthalmic solution APPLY 1 DROP INTO EYE(S) AS DIRECTED UP TO 6-8 TIMES A DAY      tamsulosin (FLOMAX) 0.4 MG capsule Take 0.4 mg by mouth      triamcinolone (KENALOG) 0.1 % external ointment       Urea 20 % OINT Apply 1 Dose topically       No current facility-administered medications for this visit.        Vitals:  BP (!) 182/99 (BP Location: Right arm, Patient Position: Chair, Cuff Size: Adult Regular)   Pulse 62   Resp 16   Ht 1.71 m (5' 7.32\")   Wt 94.8 kg (209 lb 1.6 oz)   SpO2 100%   BMI 32.44 kg/m       Exam:  GENERAL APPEARANCE: alert and no distress  HENT: mouth without ulcers or lesions  LYMPHATICS: no " cervical or supraclavicular nodes  RESP: lungs clear to auscultation - no rales, rhonchi or wheezes  CV: regular rhythm, normal rate, no rub, no murmur  EDEMA: no LE edema bilaterally  ABDOMEN: soft, nondistended, nontender, bowel sounds normal  MS: extremities normal - no gross deformities noted, no evidence of inflammation in joints, no muscle tenderness  SKIN: no rash  DIALYSIS ACCESS:  LUE AV fistula ***

## 2025-07-14 NOTE — Clinical Note
2025      Olayinka Arciniega  [facility]  [address]  [facility] [address]      Dear Colleague,    Thank you for referring your patient, Olayinka Arciniega, to the [facility]. Please see a copy of my visit note below.    TRANSPLANT NEPHROLOGY WAITLIST VISIT    Assessment and Plan:  # Kidney ***Transplant Wait List Evaluation: Patient is a {desc.:601236} candidate overall. Patient should {Wait List Status:376880} on the wait list.    # ESKD from presumed diabetes mellitus type 2: although doing OK on hemodialysis since 3/2023, he may benefit from a kidney transplant, ABO-B, no donors.      #Type 2 diabetes: A1c 6.8% ~ April , off insulin x 3 months**plus ozempic qweek.  113-145    # Cardiac Risk: ***  Angio -essencia:   HTN:140s , Hypotension at dialysis requiring volume back.      # Obesity: *** BMI 36.5 at eval with 20lb weight loss goal, down to 209lbs. 32.4, on Ozempic  x1 months    # Urinary retention: on flomax, difficult starting stream at times, feels emptying bladder.     # Resides in correctional facility: where staff administers all medications where he reports good compliance. Appreciate social work input.   # H/o polysubstance use (cocaine, marijuana): went to CD treatment at some point, quit in . Appreciate social work input.     #Insomnia: takes benadryl as a sleep aide  # Health Maintenance: Colonoscopy: Up to date 2019-repeat 10 years, and Dental: Up to date     Discussed the risks and benefits of a transplant, including the risk of surgery and immunosuppression medications.    KDPI: We discussed approximate remaining wait time and how that is influenced by issues such as blood type and sensitization (PRA) and access to a living donor. I contrasted potential waiting time for living vs  donor kidneys from  normal (0-85%) or higher (%) kidney donor profile index (KDPI) donors and their associated outcomes. I {Would:197701} recommend Mr. Arciniega  to consider kidneys from high KDPI donors. The reason for this decision is best summarized as: {KDPI REASON:537453516}.    {FV RENAL TX P KIDNEY CANDIDATE WAITLIST (Optional):49144833}  Patient s overall re-evaluation may require further discussion in the Transplant Program s multidisciplinary selection committee for a final recommendation on the patient s suitability for transplant.     Reason for Visit:  Olayinka Arciniega is a 65 year old male with ESKD from diabetes mellitus type 2, who presents for kidney transplant wait list evaluation.     Date of Initial Transplant Evaluation:  5/8/2023        Current Transplant Phase: Evaluation: Active  Official UNOS Listing Date:    Blood Type: B POS          cPRA: ***       Date of cPRA: ***  Transplant Coordinator: Marta Meza Transplant Office phone number [phone]  Primary Nephrologist: { :[phone]}.     Previous Medical Issues:  {FV RENAL TX OPTIONAL COMMENT (Optional):914971}     History of Present Illness:   ***         Interim Events: {FV Renal Tx None vs Blank w/ Indent:838203}          Kidney Disease: ***       Kidney Disease Dx: Diabetes mellitus type 2        On Dialysis: Yes, Date initiated: 3/2023 and Dialysis Type: [facility];       Primary Nephrologist: ***           Diabetes: ***       Diabetic Control: { Diabetes Control:321647}      Last HbA1c:        Hypoglycemic Unawareness: {YES WITH WILD CARD/NO:73465995}       New Issues: {YES WITH WILD CARD/NO:63999272}         Cardiac/Vascular Disease History:       Known CAD: {FV Renal Tx Known CAD w/ Asessments:859867}       Known PAD/Claudication Symptoms: {YES WITH WILD CARD/NO:58721064}       Known Heart Failure: {Cardiac Risk Factors:436114}       Arrhythmia:  No       Pulmonary Hypertension: {YES WITH WILD CARD/NO:85579971}        Valvular Disease: {YES WITH WILD CARD/NO:58678336}       Other: {Other Risk Factors:099330}       New Cardiac/Vascular Events: {YES WITH WILD CARD/NO:44194456}          Functional Capacity/Frailty:         Walking 1-2 miles/day. NO    # COVID Vaccination Up To Date: { :535115}         {FV RENAL TX OPTIONAL COMMENT (Optional):513339}    Other Pertinent Transplant Surgical Issues:  Recent Blood Transfusion: No  Previous Abdominal Transplant: No  Bladder Dysfunction: {YES WITH WILD CARD/NO:48701943}  Chronic/Recurrent Infections: No  Chronic Anticoagulation: No  Jehovah s Witness: No       Active Problem List:   Patient Active Problem List   Diagnosis    Diabetes mellitus, type 2 (H)    ESRD (end stage renal disease) on dialysis (H)    Diabetic neuropathy (H)    SNHL (sensory-neural hearing loss), asymmetrical    Morbid obesity (H)    Hypertension    Secondary hyperparathyroidism       Personal History:  *** former smoker, alcohol, drugs       Allergies:  Allergies   Allergen Reactions    Ibuprofen Nausea        Medications:  Current Outpatient Medications   Medication Sig Dispense Refill    ASPIRIN LOW DOSE 81 MG EC tablet Take 1 tablet by mouth daily.      atorvastatin (LIPITOR) 40 MG tablet Take 40 mg by mouth At Bedtime      calcium acetate (PHOSLO) 667 MG CAPS capsule Take 667 mg by mouth 3 times daily (with meals).      famotidine (PEPCID) 20 MG tablet Take 20 mg by mouth      lidocaine-prilocaine (EMLA) 2.5-2.5 % external cream APPLY TOPICALLY 3 TIMES A WEEK ON DIALYSIS DAYS TO LAV LEFT ARM 30 MIN PRIOR TO LEAVING      lisinopril (ZESTRIL) 10 MG tablet Take 1 tablet by mouth daily      OZEMPIC, 1 MG/DOSE, 4 MG/3ML pen Inject 1 mg subcutaneously every 7 days.      polyvinyl alcohol (LIQUIFILM TEARS) 1.4 % ophthalmic solution APPLY 1 DROP INTO EYE(S) AS DIRECTED UP TO 6-8 TIMES A DAY      tamsulosin (FLOMAX) 0.4 MG capsule Take 0.4 mg by mouth      triamcinolone (KENALOG) 0.1 % external ointment       Urea 20 % OINT Apply 1 Dose topically       No current facility-administered medications for this visit.        Vitals:  BP (!) 182/99 (BP Location: Right arm, Patient Position:  "Chair, Cuff Size: Adult Regular)   Pulse 62   Resp 16   Ht 1.71 m (5' 7.32\")   Wt 94.8 kg (209 lb 1.6 oz)   SpO2 100%   BMI 32.44 kg/m       Exam:  GENERAL APPEARANCE: alert and no distress  HENT: mouth without ulcers or lesions  LYMPHATICS: no cervical or supraclavicular nodes  RESP: lungs clear to auscultation - no rales, rhonchi or wheezes  CV: regular rhythm, normal rate, no rub, no murmur  EDEMA: no LE edema bilaterally  ABDOMEN: soft, nondistended, nontender, bowel sounds normal  MS: extremities normal - no gross deformities noted, no evidence of inflammation in joints, no muscle tenderness  SKIN: no rash  DIALYSIS ACCESS:  LUE AV fistula ***            Again, thank you for allowing me to participate in the care of your patient.        Sincerely,        Francie Antonio NP    Electronically signed"

## 2025-07-16 NOTE — PROGRESS NOTES
Pre-Transplant Waitlist Patient Social Work Assessment:    Patient Name: Olayinka Arciniega  : 1960  Age: 65 year old  MRN: [id number]  Date of Initial Social Work Evaluation: 23    Patient is on the kidney/pancreas transplant wait list. Met with patient and two staff members from the [facility] program, [place], today to update his psychosocial assessment.      Presenting Information:  Current Living Situation: Patient currently resides at [facility] in [place], [place] where he has been residing for the past 16 yrs. Does not have any plans for transferring.   If not local, plans for short-term stay post-transplant: Unable to utilize post-transplant lodging due to current status as [facility] resident. Patient would either return back to his facility post-transplant or transition to a TCU/ARU if medically acceptable.   Previous Functional Status: Patient is ambulatory, wears glasses, has hearing loss (40% in right ear, 100% in left ear per patient report), and does not use any DME at this time. Was previously using a walker regularly, however, has been trying to build his strength and go without it. His facility provides assistance with meal preparation, medication management, transportation, etc.   Cultural/Language/Spiritual Considerations: Patient was born in [place] and raised in [place], [place]. Patient speaks both English and Dominican.     Support System:  Primary Support Person(s): Identified the staff members at his facility as his primary supports post-transplant.   Other Supports: None reported or indicated. Patient has no living parents, three siblings, and two children.   Plan for support in immediate post-transplant period: Unable to utilize post-transplant lodging due to current status as [facility] resident. Patient would either return back to his facility post-transplant or transition to a TCU/ARU if medically acceptable.     Health Care Directive Information:  Primary  "Decision Maker: Self.   Alternate Decision Maker: Unclear - [facility] Facility?  Health Care Directive: None on file. Provided education.     Mental Health & AODA Concerns/History:  History/Changes to Mental Health: Denied current or history of concerns. Denied SI today or within past two weeks. Denied prior hospitalizations, self-injurious behaviors, and previous suicide attempts. Patient reported that they are not taking any medications for mental health/symptom management or seeing a therapist at this time.   History/Changes to Chemical Health: Denied current chemical health concerns. History of smoking and chewing tobacco use. Quit when he was 23 yrs old. Also has a history of polysubstance abuse (cocaine and marijuana), quit in 1992.   Current Status: Appropriate.   Coping Strategies: Cooking, listening to music, spending time outside.   Services Needed/Recommended: None reported or indicated at this time.   Compliance with Medications, Appointments, Etc: Completes dialysis 3x/week at the [facility] in [place], [place].     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   Primary Source of Income: None.   Impact of Transplant on Income: N/A  Insurance and Medication Coverage: Medicaid [place].  Financial Concerns: None reported or indicated at this time.   Resources Needed: None reported or indicated at this time.     Assessment, Recommendations, and Plan:  Transplant  reviewed transplant education (Medicare, rehabilitation, donor issues, community/financial resources, and psych/family adjustment) as well as psychosocial risks of transplant.  provided patient with a handout on post-transplant expectations, information on ESRD Medicare, health care directive information, caregiver expectations, as well as 's contact information. Overall, behavior was appropriate during " today's interview and patient appeared well informed, motivated, and able to follow post-transplant requirements. Patient has adequate income, insurance coverage, and a post-transplant caregiver support plan. No major contraindications noted for transplant and at this time, patient appears to understand the risks and benefits of transplant. [facility] Social Work Team will continue to monitor for psychosocial supports, resources, and advocate on patient's behalf.  encouraged patient to follow-up as needed for questions and concerns.     Contacts/Important Notes:  - [facility]: [phone] ([place])  - Fax: [phone] ([facility])  - All information can be sent to [address] [place] [address] (ATTN: Health Services)  - The client is not to be notified of appointment dates and times until shortly before they occur.  - If the client is hospitalized overnight, [facility] requires medical staff to contact the Health Services Department prior to discharge to obtain clearance to receive the client at the facility.     URSULA Martinez, [facility] CenterSW  Kidney, Pancreas, & Auto-Islet Transplant  (CINDY-J)   [facility] Acute Care Management  Phone: [phone]  Available on Vocera: Kidney, Pancreas, Auto-Islet

## 2025-07-29 PROBLEM — Z76.82 ORGAN TRANSPLANT CANDIDATE: Status: ACTIVE | Noted: 2025-07-29

## 2025-07-29 PROBLEM — Z12.5 PROSTATE CANCER SCREENING: Status: ACTIVE | Noted: 2025-07-29
